# Patient Record
Sex: FEMALE | Race: WHITE | NOT HISPANIC OR LATINO | Employment: OTHER | URBAN - METROPOLITAN AREA
[De-identification: names, ages, dates, MRNs, and addresses within clinical notes are randomized per-mention and may not be internally consistent; named-entity substitution may affect disease eponyms.]

---

## 2019-02-08 ENCOUNTER — HOSPITAL ENCOUNTER (EMERGENCY)
Facility: HOSPITAL | Age: 59
Discharge: HOME | End: 2019-02-08
Attending: EMERGENCY MEDICINE
Payer: COMMERCIAL

## 2019-02-08 VITALS
WEIGHT: 170 LBS | SYSTOLIC BLOOD PRESSURE: 154 MMHG | TEMPERATURE: 98 F | BODY MASS INDEX: 34.27 KG/M2 | DIASTOLIC BLOOD PRESSURE: 73 MMHG | HEIGHT: 59 IN | OXYGEN SATURATION: 92 % | RESPIRATION RATE: 20 BRPM | HEART RATE: 101 BPM

## 2019-02-08 DIAGNOSIS — R06.02 SHORTNESS OF BREATH: ICD-10-CM

## 2019-02-08 DIAGNOSIS — J40 BRONCHITIS: Primary | ICD-10-CM

## 2019-02-08 LAB
ALBUMIN SERPL BCP-MCNC: 3.3 G/DL
ALP SERPL-CCNC: 120 U/L
ALT SERPL W/O P-5'-P-CCNC: 16 U/L
ANION GAP SERPL CALC-SCNC: 12 MMOL/L
AST SERPL-CCNC: 17 U/L
BASOPHILS # BLD AUTO: 0.01 K/UL
BASOPHILS NFR BLD: 0.1 %
BILIRUB SERPL-MCNC: 0.3 MG/DL
BNP SERPL-MCNC: 174 PG/ML
BUN SERPL-MCNC: 10 MG/DL
CALCIUM SERPL-MCNC: 9.4 MG/DL
CHLORIDE SERPL-SCNC: 106 MMOL/L
CO2 SERPL-SCNC: 20 MMOL/L
CREAT SERPL-MCNC: 0.8 MG/DL
DIFFERENTIAL METHOD: ABNORMAL
EOSINOPHIL # BLD AUTO: 0.6 K/UL
EOSINOPHIL NFR BLD: 5.1 %
ERYTHROCYTE [DISTWIDTH] IN BLOOD BY AUTOMATED COUNT: 12.5 %
EST. GFR  (AFRICAN AMERICAN): >60 ML/MIN/1.73 M^2
EST. GFR  (NON AFRICAN AMERICAN): >60 ML/MIN/1.73 M^2
GLUCOSE SERPL-MCNC: 115 MG/DL
HCT VFR BLD AUTO: 36.6 %
HGB BLD-MCNC: 12.2 G/DL
INR PPP: 1
LYMPHOCYTES # BLD AUTO: 0.8 K/UL
LYMPHOCYTES NFR BLD: 6.8 %
MCH RBC QN AUTO: 32 PG
MCHC RBC AUTO-ENTMCNC: 33.3 G/DL
MCV RBC AUTO: 96 FL
MONOCYTES # BLD AUTO: 1.4 K/UL
MONOCYTES NFR BLD: 11.6 %
NEUTROPHILS # BLD AUTO: 9.5 K/UL
NEUTROPHILS NFR BLD: 76 %
PLATELET # BLD AUTO: 366 K/UL
PMV BLD AUTO: 8.9 FL
POTASSIUM SERPL-SCNC: 4 MMOL/L
PROT SERPL-MCNC: 6.6 G/DL
PROTHROMBIN TIME: 10.2 SEC
RBC # BLD AUTO: 3.81 M/UL
SODIUM SERPL-SCNC: 138 MMOL/L
TROPONIN I SERPL DL<=0.01 NG/ML-MCNC: <0.006 NG/ML
WBC # BLD AUTO: 12.44 K/UL

## 2019-02-08 PROCEDURE — 63600175 PHARM REV CODE 636 W HCPCS: Performed by: EMERGENCY MEDICINE

## 2019-02-08 PROCEDURE — 99285 EMERGENCY DEPT VISIT HI MDM: CPT | Mod: 25

## 2019-02-08 PROCEDURE — 83880 ASSAY OF NATRIURETIC PEPTIDE: CPT

## 2019-02-08 PROCEDURE — 84484 ASSAY OF TROPONIN QUANT: CPT

## 2019-02-08 PROCEDURE — 80053 COMPREHEN METABOLIC PANEL: CPT

## 2019-02-08 PROCEDURE — 85610 PROTHROMBIN TIME: CPT

## 2019-02-08 PROCEDURE — 85025 COMPLETE CBC W/AUTO DIFF WBC: CPT

## 2019-02-08 PROCEDURE — 25000242 PHARM REV CODE 250 ALT 637 W/ HCPCS: Performed by: EMERGENCY MEDICINE

## 2019-02-08 PROCEDURE — 96374 THER/PROPH/DIAG INJ IV PUSH: CPT

## 2019-02-08 PROCEDURE — 94640 AIRWAY INHALATION TREATMENT: CPT

## 2019-02-08 PROCEDURE — 93005 ELECTROCARDIOGRAM TRACING: CPT

## 2019-02-08 RX ORDER — ISOSORBIDE DINITRATE 30 MG/1
20 TABLET ORAL 3 TIMES DAILY
COMMUNITY

## 2019-02-08 RX ORDER — METOPROLOL TARTRATE 50 MG/1
50 TABLET ORAL 2 TIMES DAILY
COMMUNITY
End: 2020-02-28 | Stop reason: SDUPTHER

## 2019-02-08 RX ORDER — IPRATROPIUM BROMIDE AND ALBUTEROL SULFATE 2.5; .5 MG/3ML; MG/3ML
3 SOLUTION RESPIRATORY (INHALATION)
Status: COMPLETED | OUTPATIENT
Start: 2019-02-08 | End: 2019-02-08

## 2019-02-08 RX ORDER — BENZONATATE 100 MG/1
100 CAPSULE ORAL 3 TIMES DAILY PRN
Qty: 20 CAPSULE | Refills: 0 | Status: SHIPPED | OUTPATIENT
Start: 2019-02-08 | End: 2019-02-18

## 2019-02-08 RX ORDER — METHYLPREDNISOLONE SOD SUCC 125 MG
125 VIAL (EA) INJECTION
Status: COMPLETED | OUTPATIENT
Start: 2019-02-08 | End: 2019-02-08

## 2019-02-08 RX ORDER — RANOLAZINE 500 MG/1
1000 TABLET, EXTENDED RELEASE ORAL 2 TIMES DAILY
COMMUNITY

## 2019-02-08 RX ORDER — ALBUTEROL SULFATE 90 UG/1
1-2 AEROSOL, METERED RESPIRATORY (INHALATION) EVERY 6 HOURS PRN
Qty: 1 INHALER | Refills: 0 | Status: SHIPPED | OUTPATIENT
Start: 2019-02-08 | End: 2020-03-09 | Stop reason: SDUPTHER

## 2019-02-08 RX ORDER — ONDANSETRON 4 MG/1
8 TABLET, FILM COATED ORAL 2 TIMES DAILY
COMMUNITY

## 2019-02-08 RX ORDER — ALPRAZOLAM 0.25 MG/1
0.25 TABLET ORAL 2 TIMES DAILY
COMMUNITY

## 2019-02-08 RX ORDER — LOVASTATIN 20 MG/1
20 TABLET ORAL NIGHTLY
COMMUNITY

## 2019-02-08 RX ORDER — CLOPIDOGREL BISULFATE 75 MG/1
75 TABLET ORAL DAILY
COMMUNITY

## 2019-02-08 RX ADMIN — IPRATROPIUM BROMIDE AND ALBUTEROL SULFATE 3 ML: .5; 3 SOLUTION RESPIRATORY (INHALATION) at 11:02

## 2019-02-08 RX ADMIN — METHYLPREDNISOLONE SODIUM SUCCINATE 125 MG: 125 INJECTION, POWDER, FOR SOLUTION INTRAMUSCULAR; INTRAVENOUS at 11:02

## 2019-02-08 NOTE — ED PROVIDER NOTES
Encounter Date: 2/8/2019    SCRIBE #1 NOTE: I, Renetta Jefferson, am scribing for, and in the presence of,  Dr. Platt. I have scribed the entire note.       History     Chief Complaint   Patient presents with    Shortness of Breath     hx of bronchitis, increase SOB began this am, denies asthma, EMS reports 93% on room air, after resp treatment 100% sats      Tia Cisneros is a 58 y.o. female who  has a past medical history of CHF (congestive heart failure), Coronary artery disease, Hypertension, and Pacemaker.    The patient presents to the ED due to shortness of breath. She reports onset of symptoms was last night. The patient does note she began with cough and congestion yesterday. She has yellow mucus production with cough. The patient has associated wheezing and chest heaviness but denies any  fever or chills. She notes some improvement of symptoms after breathing treatment provided by EMS. As per EMS prior to treatment the patient had an oxygen saturation of 93% which improved to 98%. She states she had similar symptoms a week ago and was treated with medications by an urgent care. The patient was prescribed Doxycycline. She admits to smoking cigarettes.       The history is provided by the patient.     Review of patient's allergies indicates:   Allergen Reactions    Iodine and iodide containing products Anaphylaxis    Codeine Nausea And Vomiting     Past Medical History:   Diagnosis Date    CHF (congestive heart failure)     Coronary artery disease     Hypertension     Pacemaker      Past Surgical History:   Procedure Laterality Date    CORONARY ANGIOPLASTY WITH STENT PLACEMENT      CORONARY ARTERY BYPASS GRAFT       History reviewed. No pertinent family history.  Social History     Tobacco Use    Smoking status: Current Every Day Smoker    Smokeless tobacco: Former User   Substance Use Topics    Alcohol use: No     Frequency: Never    Drug use: Not on file     Review of Systems    Constitutional: Negative for chills and fever.   HENT: Positive for congestion. Negative for rhinorrhea and sore throat.    Eyes: Negative for redness and visual disturbance.   Respiratory: Positive for cough, shortness of breath and wheezing.    Cardiovascular: Negative for chest pain and palpitations.   Gastrointestinal: Negative for abdominal pain, diarrhea, nausea and vomiting.   Genitourinary: Negative for dysuria and hematuria.   Musculoskeletal: Negative for back pain, myalgias and neck pain.   Skin: Negative for rash.   Neurological: Negative for dizziness, weakness and light-headedness.   Psychiatric/Behavioral: Negative for confusion.   All other systems reviewed and are negative.      Physical Exam     Initial Vitals [02/08/19 1037]   BP Pulse Resp Temp SpO2   (!) 165/96 103 (!) 21 99.6 °F (37.6 °C) 100 %      MAP       --         Physical Exam    Nursing note and vitals reviewed.  Constitutional: She appears well-developed and well-nourished. She is not diaphoretic. She appears distressed.   Arrived on supplemental oxygen nasal cannula   HENT:   Head: Normocephalic and atraumatic.   Eyes: Conjunctivae and EOM are normal.   Neck: Normal range of motion. Neck supple.   Cardiovascular: Regular rhythm and normal heart sounds. Tachycardia present.  Exam reveals no gallop and no friction rub.    No murmur heard.  Pulmonary/Chest: She has wheezes (bilateral expiratory). She has no rhonchi. She has no rales.   Musculoskeletal: Normal range of motion. She exhibits no edema or tenderness.   Lymphadenopathy:     She has no cervical adenopathy.   Neurological: She is alert and oriented to person, place, and time. She has normal strength.   Skin: Skin is warm and dry. No rash noted.         ED Course   Procedures  Labs Reviewed   CBC W/ AUTO DIFFERENTIAL - Abnormal; Notable for the following components:       Result Value    RBC 3.81 (*)     Hematocrit 36.6 (*)     MCH 32.0 (*)     Platelets 366 (*)     MPV 8.9 (*)      Gran # (ANC) 9.5 (*)     Lymph # 0.8 (*)     Mono # 1.4 (*)     Eos # 0.6 (*)     Gran% 76.0 (*)     Lymph% 6.8 (*)     All other components within normal limits   COMPREHENSIVE METABOLIC PANEL - Abnormal; Notable for the following components:    CO2 20 (*)     Glucose 115 (*)     Albumin 3.3 (*)     All other components within normal limits   B-TYPE NATRIURETIC PEPTIDE - Abnormal; Notable for the following components:     (*)     All other components within normal limits   TROPONIN I   PROTIME-INR     EKG Readings: (Independently Interpreted)   Sinus tachycardia with a rate of 102. Non-specific ST and T wave changes in inferolateral leads. WY interval of 202 ms. No STEMI       Imaging Results          X-Ray Chest AP Portable (Final result)  Result time 02/08/19 12:18:09    Final result by Gera Vila Jr., MD (02/08/19 12:18:09)                 Impression:      Mild increase in interstitial markings nonspecific.      Electronically signed by: Gera Vila MD  Date:    02/08/2019  Time:    12:18             Narrative:    EXAMINATION:  XR CHEST AP PORTABLE    CLINICAL HISTORY:  CHF;    TECHNIQUE:  Single frontal view of the chest was performed.    COMPARISON:  None    FINDINGS:  AICD and monitoring leads noted.  Necklace overlies the upper chest.  Heart size is normal.  Mild increase in interstitial markings.  No confluent consolidation.  No pneumothorax.                                 Medical Decision Making:   Initial Assessment:   The patient presents to the ED due to shortness of breath.  Differential Diagnosis:   Pneumonia, ACS, bronchitis, COPD, CHF  Independently Interpreted Test(s):   I have ordered and independently interpreted EKG Reading(s) - see prior notes  Clinical Tests:   Lab Tests: Ordered and Reviewed  Radiological Study: Ordered and Reviewed  Medical Tests: Ordered and Reviewed  ED Management:  1:10 PM Patient is 94% on room air, patient walking to and from the restroom without  difficulty. Patient non-toxic in appearance. No further wheezing on exam.                       Clinical Impression:     1. Bronchitis    2. Shortness of breath            I, Dr. Markos Platt, personally performed the services described in this documentation. All medical record entries made by the scribe were at my direction and in my presence.  I have reviewed the chart and agree that the record reflects my personal performance and is accurate and complete                       Markos Platt MD  02/08/19 3453

## 2019-02-08 NOTE — DISCHARGE INSTRUCTIONS
Return promptly if concerning symptoms arise, quit smoking, finish your recently prescribed antibiotics

## 2019-02-08 NOTE — ED NOTES
Pt presented to er with c/o sob x 2 days. No c/o chest pain or pressure. Pt stated she has a history of bronchitis. O2 93% on RA. Pt placed on o2 at 2L nc. No aute distress noted.

## 2020-02-28 ENCOUNTER — HOSPITAL ENCOUNTER (EMERGENCY)
Facility: HOSPITAL | Age: 60
Discharge: HOME | End: 2020-02-28
Attending: EMERGENCY MEDICINE
Payer: COMMERCIAL

## 2020-02-28 VITALS
SYSTOLIC BLOOD PRESSURE: 126 MMHG | DIASTOLIC BLOOD PRESSURE: 69 MMHG | BODY MASS INDEX: 34.27 KG/M2 | HEART RATE: 86 BPM | WEIGHT: 170 LBS | TEMPERATURE: 98 F | RESPIRATION RATE: 16 BRPM | HEIGHT: 59 IN | OXYGEN SATURATION: 94 %

## 2020-02-28 DIAGNOSIS — R07.9 CHEST PAIN: ICD-10-CM

## 2020-02-28 LAB
ALBUMIN SERPL BCP-MCNC: 3.4 G/DL (ref 3.5–5.2)
ALP SERPL-CCNC: 112 U/L (ref 55–135)
ALT SERPL W/O P-5'-P-CCNC: 17 U/L (ref 10–44)
ANION GAP SERPL CALC-SCNC: 13 MMOL/L (ref 8–16)
AST SERPL-CCNC: 12 U/L (ref 10–40)
BASOPHILS # BLD AUTO: 0.02 K/UL (ref 0–0.2)
BASOPHILS NFR BLD: 0.1 % (ref 0–1.9)
BILIRUB SERPL-MCNC: 0.3 MG/DL (ref 0.1–1)
BNP SERPL-MCNC: 67 PG/ML (ref 0–99)
BUN SERPL-MCNC: 16 MG/DL (ref 6–20)
CALCIUM SERPL-MCNC: 9.8 MG/DL (ref 8.7–10.5)
CHLORIDE SERPL-SCNC: 107 MMOL/L (ref 95–110)
CO2 SERPL-SCNC: 23 MMOL/L (ref 23–29)
CREAT SERPL-MCNC: 0.9 MG/DL (ref 0.5–1.4)
DIFFERENTIAL METHOD: ABNORMAL
EOSINOPHIL # BLD AUTO: 0.4 K/UL (ref 0–0.5)
EOSINOPHIL NFR BLD: 2.5 % (ref 0–8)
ERYTHROCYTE [DISTWIDTH] IN BLOOD BY AUTOMATED COUNT: 12.7 % (ref 11.5–14.5)
EST. GFR  (AFRICAN AMERICAN): >60 ML/MIN/1.73 M^2
EST. GFR  (NON AFRICAN AMERICAN): >60 ML/MIN/1.73 M^2
GLUCOSE SERPL-MCNC: 100 MG/DL (ref 70–110)
HCT VFR BLD AUTO: 39.4 % (ref 37–48.5)
HGB BLD-MCNC: 12.9 G/DL (ref 12–16)
IMM GRANULOCYTES # BLD AUTO: 0.03 K/UL (ref 0–0.04)
IMM GRANULOCYTES NFR BLD AUTO: 0.2 % (ref 0–0.5)
LYMPHOCYTES # BLD AUTO: 2 K/UL (ref 1–4.8)
LYMPHOCYTES NFR BLD: 13.3 % (ref 18–48)
MCH RBC QN AUTO: 32.4 PG (ref 27–31)
MCHC RBC AUTO-ENTMCNC: 32.7 G/DL (ref 32–36)
MCV RBC AUTO: 99 FL (ref 82–98)
MONOCYTES # BLD AUTO: 1.1 K/UL (ref 0.3–1)
MONOCYTES NFR BLD: 7.4 % (ref 4–15)
NEUTROPHILS # BLD AUTO: 11.5 K/UL (ref 1.8–7.7)
NEUTROPHILS NFR BLD: 76.5 % (ref 38–73)
NRBC BLD-RTO: 0 /100 WBC
PLATELET # BLD AUTO: 498 K/UL (ref 150–350)
PMV BLD AUTO: 8.8 FL (ref 9.2–12.9)
POTASSIUM SERPL-SCNC: 3.7 MMOL/L (ref 3.5–5.1)
PROT SERPL-MCNC: 7.2 G/DL (ref 6–8.4)
RBC # BLD AUTO: 3.98 M/UL (ref 4–5.4)
SODIUM SERPL-SCNC: 143 MMOL/L (ref 136–145)
TROPONIN I SERPL DL<=0.01 NG/ML-MCNC: 0.01 NG/ML (ref 0–0.03)
WBC # BLD AUTO: 15.08 K/UL (ref 3.9–12.7)

## 2020-02-28 PROCEDURE — 85025 COMPLETE CBC W/AUTO DIFF WBC: CPT

## 2020-02-28 PROCEDURE — 93010 ELECTROCARDIOGRAM REPORT: CPT | Mod: ,,, | Performed by: INTERNAL MEDICINE

## 2020-02-28 PROCEDURE — 84484 ASSAY OF TROPONIN QUANT: CPT

## 2020-02-28 PROCEDURE — 93010 EKG 12-LEAD: ICD-10-PCS | Mod: ,,, | Performed by: INTERNAL MEDICINE

## 2020-02-28 PROCEDURE — 99285 EMERGENCY DEPT VISIT HI MDM: CPT | Mod: 25

## 2020-02-28 PROCEDURE — 83880 ASSAY OF NATRIURETIC PEPTIDE: CPT

## 2020-02-28 PROCEDURE — 93005 ELECTROCARDIOGRAM TRACING: CPT

## 2020-02-28 PROCEDURE — 80053 COMPREHEN METABOLIC PANEL: CPT

## 2020-02-28 RX ORDER — METOPROLOL SUCCINATE 50 MG/1
TABLET, EXTENDED RELEASE ORAL
COMMUNITY

## 2020-02-28 RX ORDER — LISINOPRIL 10 MG/1
TABLET ORAL
COMMUNITY

## 2020-02-28 RX ORDER — LOSARTAN POTASSIUM 50 MG/1
50 TABLET ORAL
COMMUNITY

## 2020-02-28 RX ORDER — FUROSEMIDE 40 MG/1
TABLET ORAL
COMMUNITY

## 2020-02-28 RX ORDER — IBUPROFEN 800 MG/1
TABLET ORAL
COMMUNITY

## 2020-02-28 RX ORDER — CIMETIDINE 300 MG/1
TABLET, FILM COATED ORAL
COMMUNITY

## 2020-02-28 RX ORDER — ASPIRIN 325 MG
325 TABLET ORAL
Status: DISCONTINUED | OUTPATIENT
Start: 2020-02-28 | End: 2020-02-28 | Stop reason: HOSPADM

## 2020-02-28 NOTE — ED TRIAGE NOTES
"Reports to ED with c/o "hot flashes and nausea" states that she got on her knees to vomit in toilet and "woke up on the floor." Patient states that she did not hit her head but event was unwitnessed. Patient also reports cough x4 weeks with associated SOB. Reports chills. Denies abdominal pain, changes in urinary habits. \\    Two patient identifiers have been checked and are correct.      Appearance: Pt awake, alert & oriented to person, place & time. Pt in no acute distress at present time. Pt is clean and well groomed with clothes appropriately fastened.   Skin: Skin warm, dry & intact. Color consistent with ethnicity. Mucous membranes moist. No breakdown or brusing noted.   Musculoskeletal: Patient moving all extremities well, no obvious swelling or deformities noted.   Respiratory: Respirations spontaneous, even, and non-labored. Visible chest rise noted. Airway is open and patent. No accessory muscle use noted.   Neurologic: Sensation is intact. Speech is clear and appropriate. Eyes open spontaneously, behavior appropriate to situation, follows commands, facial expression symmetrical, bilateral hand grasp equal and even, purposeful motor response noted.  Cardiac: All peripheral pulses present. No Bilateral lower extremity edema. Cap refill is <3 seconds.  Abdomen: Abdomen soft, non-tender to palpation.   : Pt reports no dysuria or hematuria.             "

## 2020-02-29 NOTE — ED PROVIDER NOTES
Encounter Date: 2/28/2020    SCRIBE #1 NOTE: I, Marleny Hawkins, am scribing for, and in the presence of,  Edagr Inman MD. I have scribed the following portions of the note - Other sections scribed: HPI, ROS, PE.       History     Chief Complaint   Patient presents with    Shortness of Breath     sob.  [facility] states wheezing and tachypnic.     Loss of Consciousness     woke up on the floor earlier today.     CC: SOB; Nausea    Patient is a 60 y.o female with a PMHx of CHF, CAD, HTN, and MI who presents to the ED complaining of SOB over the past 4 week. She reports of cough and URI symptoms for 4 weeks with associated wheezing. She uses an inhaler at home. Patient has a SHx of cigarette smoking. Patient also complains episodes of nausea and hot flashes. She reports that symptoms lasted over a 1 hr this morning and returned this afternoon. These symptoms have since resolved. Patient has had these symptoms for the past year since her Pacemaker implantation. She states that episodes have been lasting longer than normal. There are no exacerbating or alleviating factors. Patient denies CP. Hx of MI, CABG. and cardiac stent placement. Her MI symptoms varied between CP and jaw pain. Her current symptoms do not feel similar to those associated with previous MI.     The history is provided by the patient.     Review of patient's allergies indicates:   Allergen Reactions    Iodine and iodide containing products Anaphylaxis    Codeine Nausea And Vomiting     Past Medical History:   Diagnosis Date    CHF (congestive heart failure)     Coronary artery disease     Hypertension     Pacemaker      Past Surgical History:   Procedure Laterality Date    CORONARY ANGIOPLASTY WITH STENT PLACEMENT      CORONARY ARTERY BYPASS GRAFT       History reviewed. No pertinent family history.  Social History     Tobacco Use    Smoking status: Current Every Day Smoker    Smokeless tobacco: Former User   Substance Use Topics     Alcohol use: No     Frequency: Never    Drug use: Not on file     Review of Systems   Constitutional: Positive for diaphoresis (Resolved). Negative for fever.   HENT: Negative for congestion, postnasal drip, rhinorrhea, sinus pressure, sneezing and sore throat.    Eyes: Negative for discharge and redness.   Respiratory: Positive for cough, shortness of breath and wheezing.    Cardiovascular: Negative for chest pain.   Gastrointestinal: Positive for nausea (Resolved). Negative for abdominal pain, blood in stool, constipation, diarrhea and vomiting.   Genitourinary: Negative for dysuria, hematuria, pelvic pain, vaginal bleeding, vaginal discharge and vaginal pain.   Musculoskeletal: Negative for arthralgias and myalgias.   Skin: Negative for rash and wound.   Neurological: Negative for weakness, light-headedness and headaches.   Psychiatric/Behavioral: Negative for agitation and confusion. The patient is not nervous/anxious.        Physical Exam     Initial Vitals [02/28/20 1754]   BP Pulse Resp Temp SpO2   128/87 88 18 98.5 °F (36.9 °C) 96 %      MAP       --         Physical Exam    Nursing note and vitals reviewed.  Constitutional: She appears well-developed and well-nourished. She is not diaphoretic. No distress.   HENT:   Head: Normocephalic and atraumatic.   Eyes: Conjunctivae are normal. Right eye exhibits no discharge. Left eye exhibits no discharge. No scleral icterus.   Neck: No tracheal deviation present. No JVD present.   Cardiovascular: Normal rate, regular rhythm, normal heart sounds and intact distal pulses. Exam reveals no gallop and no friction rub.    No murmur heard.  Pulmonary/Chest: No stridor. No respiratory distress. She has no wheezes. She has no rhonchi. She has no rales.   Abdominal: Soft. She exhibits no distension. There is no tenderness. There is no rebound and no guarding.   Musculoskeletal: Normal range of motion. She exhibits no edema or tenderness.   ALONZO with MELISSA's   Neurological:  She is alert and oriented to person, place, and time. She has normal strength.   ALONZO with NGND's   Skin: Skin is warm and dry. Capillary refill takes less than 2 seconds. No rash and no abscess noted. No erythema. No pallor.   Psychiatric: She has a normal mood and affect. Her behavior is normal. Judgment and thought content normal.         ED Course   Procedures  Labs Reviewed   CBC W/ AUTO DIFFERENTIAL - Abnormal; Notable for the following components:       Result Value    WBC 15.08 (*)     RBC 3.98 (*)     Mean Corpuscular Volume 99 (*)     Mean Corpuscular Hemoglobin 32.4 (*)     Platelets 498 (*)     MPV 8.8 (*)     Gran # (ANC) 11.5 (*)     Mono # 1.1 (*)     Gran% 76.5 (*)     Lymph% 13.3 (*)     All other components within normal limits   COMPREHENSIVE METABOLIC PANEL - Abnormal; Notable for the following components:    Albumin 3.4 (*)     All other components within normal limits   TROPONIN I   B-TYPE NATRIURETIC PEPTIDE     EKG Readings: (Independently Interpreted)   Initial Reading: No STEMI. Rhythm: Normal Sinus Rhythm. Heart Rate: 81. Ectopy: No Ectopy. Conduction: Normal. ST Segments: Normal ST Segments. T Waves: Normal. Axis: Normal. Clinical Impression: Normal Sinus Rhythm     ECG Results          EKG 12-lead (Final result)  Result time 02/29/20 14:41:52    Final result by Interface, Lab In [facility] (02/29/20 14:41:52)                 Narrative:    Test Reason : R07.9,    Vent. Rate : 081 BPM     Atrial Rate : 081 BPM     P-R Int : 168 ms          QRS Dur : 106 ms      QT Int : 386 ms       P-R-T Axes : 082 069 245 degrees     QTc Int : 448 ms    Normal sinus rhythm  Possible Left atrial enlargement  ST and T wave abnormality, consider inferolateral ischemia  Abnormal ECG  When compared with ECG of 08-FEB-2019 11:29,  No significant change was found  Confirmed by Rigo Jones MD ([id number]) on 2/29/2020 2:41:43 PM    Referred By: GINGER   SELF           Confirmed By:Rigo Jones MD                             Imaging Results          X-Ray Chest AP Portable (Final result)  Result time 02/28/20 18:40:21    Final result by Leo Patterson MD (02/28/20 18:40:21)                 Impression:      No acute abnormality.      Electronically signed by: Leo Patterson  Date:    02/28/2020  Time:    18:40             Narrative:    EXAMINATION:  XR CHEST AP PORTABLE    CLINICAL HISTORY:  Chest Pain;    TECHNIQUE:  Single frontal view of the chest was performed.    COMPARISON:  02/08/2019    FINDINGS:  Sternotomy wires are present.    Cardiac assist device on the left.    The lungs are clear, with normal appearance of pulmonary vasculature and no pleural effusion or pneumothorax.    The cardiac silhouette is normal in size. The hilar and mediastinal contours are unremarkable.    Bones are intact.    No significant change.                                 Medical Decision Making:   History:   Old Medical Records: I decided to obtain old medical records.  Independently Interpreted Test(s):   I have ordered and independently interpreted EKG Reading(s) - see prior notes  Clinical Tests:   Lab Tests: Ordered and Reviewed  Radiological Study: Ordered and Reviewed  Medical Tests: Ordered and Reviewed            Scribe Attestation:   Scribe #1: I performed the above scribed service and the documentation accurately describes the services I performed. I attest to the accuracy of the note.      Pt arrived alert, afebrile, non-toxic in appearance, in no acute respiratory distress with VSS.  EKG revealed no acute findings concerning for ischemia, arrhythmia, heart block or any pathology to warrant cardiology consultation.  CXR revealed no acute pathology.  Labs showed no acute findings.  Pt was asymptomatic throughout ED observation with no clinical findings to warrant further evaluation at this time. Pt discharged and counseled on the need to return to the nearest emergency room if they experience any other concerning symptoms.  Pt  counseled to F/U outpatient with a PCP over the next two to three days.    Edgar Inman MD                              Clinical Impression:       ICD-10-CM ICD-9-CM   1. Chest pain R07.9 786.50         Disposition:   Disposition: Discharged  Condition: Stable     ED Disposition Condition    Discharge Stable        ED Prescriptions     None        Follow-up Information     Follow up With Specialties Details Why Contact Info    [facility]  Schedule an appointment as soon as possible for a visit in 3 days or with your primary care physician to follow-up on today's visit [address] [place] [address]  [phone]      [facility] Emergency Medicine Go to  As needed, If symptoms worsen [address]  [facility] [id number] [phone]                         I, Edgar Inman, personally performed the services described in this documentation. All medical record entries made by the scribe were at my direction and in my presence.  I have reviewed the chart and agree that the record reflects my personal performance and is accurate and complete.               Edgar Inman MD  02/29/20 3340

## 2020-02-29 NOTE — ED NOTES
PT RESTING IN BED, DENIES ANY COMPLAINTS, RESP EVEN AND UNLABORED, SKIN IS WARM AND DRY, IV INTACT- NO REDNESS OR EDEMA AT THE SITE, SIDERAILS X 2

## 2020-03-09 ENCOUNTER — HOSPITAL ENCOUNTER (EMERGENCY)
Facility: HOSPITAL | Age: 60
Discharge: HOME | End: 2020-03-09
Attending: EMERGENCY MEDICINE
Payer: COMMERCIAL

## 2020-03-09 VITALS
HEIGHT: 58 IN | RESPIRATION RATE: 20 BRPM | TEMPERATURE: 98 F | SYSTOLIC BLOOD PRESSURE: 131 MMHG | DIASTOLIC BLOOD PRESSURE: 66 MMHG | BODY MASS INDEX: 35.68 KG/M2 | HEART RATE: 82 BPM | OXYGEN SATURATION: 95 % | WEIGHT: 170 LBS

## 2020-03-09 DIAGNOSIS — R06.02 SHORTNESS OF BREATH: ICD-10-CM

## 2020-03-09 DIAGNOSIS — R60.0 LEG EDEMA, LEFT: ICD-10-CM

## 2020-03-09 DIAGNOSIS — R06.02 SOB (SHORTNESS OF BREATH): ICD-10-CM

## 2020-03-09 DIAGNOSIS — J20.9 BRONCHITIS WITH BRONCHOSPASM: Primary | ICD-10-CM

## 2020-03-09 LAB
ALBUMIN SERPL BCP-MCNC: 3.3 G/DL (ref 3.5–5.2)
ALP SERPL-CCNC: 103 U/L (ref 55–135)
ALT SERPL W/O P-5'-P-CCNC: 18 U/L (ref 10–44)
ANION GAP SERPL CALC-SCNC: 12 MMOL/L (ref 8–16)
AST SERPL-CCNC: 23 U/L (ref 10–40)
BASOPHILS # BLD AUTO: 0.04 K/UL (ref 0–0.2)
BASOPHILS NFR BLD: 0.3 % (ref 0–1.9)
BILIRUB SERPL-MCNC: 0.1 MG/DL (ref 0.1–1)
BNP SERPL-MCNC: 127 PG/ML (ref 0–99)
BUN SERPL-MCNC: 13 MG/DL (ref 6–20)
CALCIUM SERPL-MCNC: 9.4 MG/DL (ref 8.7–10.5)
CHLORIDE SERPL-SCNC: 108 MMOL/L (ref 95–110)
CO2 SERPL-SCNC: 22 MMOL/L (ref 23–29)
CREAT SERPL-MCNC: 0.8 MG/DL (ref 0.5–1.4)
D DIMER PPP IA.FEU-MCNC: 0.47 MG/L FEU
DIFFERENTIAL METHOD: ABNORMAL
EOSINOPHIL # BLD AUTO: 1.5 K/UL (ref 0–0.5)
EOSINOPHIL NFR BLD: 9.4 % (ref 0–8)
ERYTHROCYTE [DISTWIDTH] IN BLOOD BY AUTOMATED COUNT: 12.7 % (ref 11.5–14.5)
EST. GFR  (AFRICAN AMERICAN): >60 ML/MIN/1.73 M^2
EST. GFR  (NON AFRICAN AMERICAN): >60 ML/MIN/1.73 M^2
GLUCOSE SERPL-MCNC: 108 MG/DL (ref 70–110)
HCT VFR BLD AUTO: 37.5 % (ref 37–48.5)
HGB BLD-MCNC: 12.6 G/DL (ref 12–16)
IMM GRANULOCYTES # BLD AUTO: 0.09 K/UL (ref 0–0.04)
IMM GRANULOCYTES NFR BLD AUTO: 0.6 % (ref 0–0.5)
LYMPHOCYTES # BLD AUTO: 2.8 K/UL (ref 1–4.8)
LYMPHOCYTES NFR BLD: 17.8 % (ref 18–48)
MCH RBC QN AUTO: 32.8 PG (ref 27–31)
MCHC RBC AUTO-ENTMCNC: 33.6 G/DL (ref 32–36)
MCV RBC AUTO: 98 FL (ref 82–98)
MONOCYTES # BLD AUTO: 1.2 K/UL (ref 0.3–1)
MONOCYTES NFR BLD: 7.6 % (ref 4–15)
NEUTROPHILS # BLD AUTO: 10.1 K/UL (ref 1.8–7.7)
NEUTROPHILS NFR BLD: 64.3 % (ref 38–73)
NRBC BLD-RTO: 0 /100 WBC
PLATELET # BLD AUTO: 560 K/UL (ref 150–350)
PLATELET BLD QL SMEAR: ABNORMAL
PMV BLD AUTO: 8.8 FL (ref 9.2–12.9)
POTASSIUM SERPL-SCNC: 5 MMOL/L (ref 3.5–5.1)
PROT SERPL-MCNC: 7.4 G/DL (ref 6–8.4)
RBC # BLD AUTO: 3.84 M/UL (ref 4–5.4)
SODIUM SERPL-SCNC: 142 MMOL/L (ref 136–145)
TROPONIN I SERPL DL<=0.01 NG/ML-MCNC: 0.01 NG/ML (ref 0–0.03)
WBC # BLD AUTO: 15.62 K/UL (ref 3.9–12.7)

## 2020-03-09 PROCEDURE — 25000003 PHARM REV CODE 250: Performed by: EMERGENCY MEDICINE

## 2020-03-09 PROCEDURE — 93010 EKG 12-LEAD: ICD-10-PCS | Mod: ,,, | Performed by: INTERNAL MEDICINE

## 2020-03-09 PROCEDURE — 96374 THER/PROPH/DIAG INJ IV PUSH: CPT

## 2020-03-09 PROCEDURE — 93005 ELECTROCARDIOGRAM TRACING: CPT

## 2020-03-09 PROCEDURE — 80053 COMPREHEN METABOLIC PANEL: CPT

## 2020-03-09 PROCEDURE — 94644 CONT INHLJ TX 1ST HOUR: CPT

## 2020-03-09 PROCEDURE — 85025 COMPLETE CBC W/AUTO DIFF WBC: CPT

## 2020-03-09 PROCEDURE — 63600175 PHARM REV CODE 636 W HCPCS: Performed by: EMERGENCY MEDICINE

## 2020-03-09 PROCEDURE — 85379 FIBRIN DEGRADATION QUANT: CPT

## 2020-03-09 PROCEDURE — 83880 ASSAY OF NATRIURETIC PEPTIDE: CPT

## 2020-03-09 PROCEDURE — 96361 HYDRATE IV INFUSION ADD-ON: CPT

## 2020-03-09 PROCEDURE — 93010 ELECTROCARDIOGRAM REPORT: CPT | Mod: ,,, | Performed by: INTERNAL MEDICINE

## 2020-03-09 PROCEDURE — 99285 EMERGENCY DEPT VISIT HI MDM: CPT | Mod: 25

## 2020-03-09 PROCEDURE — 84484 ASSAY OF TROPONIN QUANT: CPT

## 2020-03-09 PROCEDURE — 25000242 PHARM REV CODE 250 ALT 637 W/ HCPCS: Performed by: EMERGENCY MEDICINE

## 2020-03-09 RX ORDER — ONDANSETRON 4 MG/1
4 TABLET, ORALLY DISINTEGRATING ORAL
Status: COMPLETED | OUTPATIENT
Start: 2020-03-09 | End: 2020-03-09

## 2020-03-09 RX ORDER — ALBUTEROL SULFATE 90 UG/1
1-2 AEROSOL, METERED RESPIRATORY (INHALATION) EVERY 6 HOURS PRN
Qty: 18 G | Refills: 2 | Status: SHIPPED | OUTPATIENT
Start: 2020-03-09 | End: 2021-03-09

## 2020-03-09 RX ORDER — ASPIRIN 325 MG
325 TABLET ORAL
Status: COMPLETED | OUTPATIENT
Start: 2020-03-09 | End: 2020-03-09

## 2020-03-09 RX ORDER — PREDNISONE 20 MG/1
40 TABLET ORAL DAILY
Qty: 10 TABLET | Refills: 0 | Status: SHIPPED | OUTPATIENT
Start: 2020-03-09 | End: 2020-03-14

## 2020-03-09 RX ORDER — IPRATROPIUM BROMIDE AND ALBUTEROL SULFATE 2.5; .5 MG/3ML; MG/3ML
3 SOLUTION RESPIRATORY (INHALATION) EVERY 6 HOURS PRN
Qty: 1 BOX | Refills: 0 | Status: SHIPPED | OUTPATIENT
Start: 2020-03-09 | End: 2021-03-09

## 2020-03-09 RX ORDER — IPRATROPIUM BROMIDE AND ALBUTEROL SULFATE 2.5; .5 MG/3ML; MG/3ML
9 SOLUTION RESPIRATORY (INHALATION) CONTINUOUS
Status: DISPENSED | OUTPATIENT
Start: 2020-03-09 | End: 2020-03-09

## 2020-03-09 RX ORDER — METHYLPREDNISOLONE SOD SUCC 125 MG
125 VIAL (EA) INJECTION
Status: COMPLETED | OUTPATIENT
Start: 2020-03-09 | End: 2020-03-09

## 2020-03-09 RX ADMIN — IPRATROPIUM BROMIDE AND ALBUTEROL SULFATE 3 ML: .5; 3 SOLUTION RESPIRATORY (INHALATION) at 07:03

## 2020-03-09 RX ADMIN — ONDANSETRON 4 MG: 4 TABLET, ORALLY DISINTEGRATING ORAL at 06:03

## 2020-03-09 RX ADMIN — METHYLPREDNISOLONE SODIUM SUCCINATE 125 MG: 125 INJECTION, POWDER, FOR SOLUTION INTRAMUSCULAR; INTRAVENOUS at 07:03

## 2020-03-09 RX ADMIN — ASPIRIN 325 MG ORAL TABLET 325 MG: 325 PILL ORAL at 06:03

## 2020-03-09 RX ADMIN — SODIUM CHLORIDE 1000 ML: 0.9 INJECTION, SOLUTION INTRAVENOUS at 07:03

## 2020-03-09 NOTE — DISCHARGE INSTRUCTIONS
As we discussed, it is important that you return to the ER for any new concerns or symptoms, worsening of your existing symptoms, if you do not completely improve, or if you are unable to be seen by your primary care provider.    Some medical conditions are difficult to diagnose and may not be identified during an ER visit. Today, we did not find a medical condition that required inpatient admission, but please remember that medical conditions can change, and we cannot predict how you will be feeling tomorrow or the next day, so if you have any worsening or new symptoms, you should not hesitate to return to the emergency department for reevaluation.     Be sure to follow up with your primary care doctor for a recheck and to review any labs/imaging that were performed today.  If you do not have a primary care doctor, you may contact the one listed on your discharge paperwork or you may also call the [facility] Appointment Desk at [phone] to schedule an appointment with one.       All medications have side effects.  We have done our best to select a medication for you that will treat your health problem and have the least amount of side effects.  If at any time while or after you take this medication you develops new symptoms or have any concerns, it is important you stop taking the medication and call your primary care provider or return to the emergency department for evaluation.    Do not drive or operate heavy machinery for 24 hours if you have received any pain medications, sedatives or mood altering drugs during your ER visit.

## 2020-03-09 NOTE — ED NOTES
Pt resting comfortably and independently repositioned in stretcher with bed locked in lowest position for safety. NAD noted at this time. Respirations even and unlabored and visible chest rise noted. Patient offered bathroom assistance and denies need at this time. Pt instructed to call if assistance is needed. Pt on continuous cardiac, BP, and O2 monitoring. Call light within reach. No needs at this time. Will continue to monitor.

## 2020-03-09 NOTE — ED PROVIDER NOTES
Encounter Date: 3/9/2020    SCRIBE #1 NOTE: I, Jenn Stewart, am scribing for, and in the presence of,  Jayna Daniel MD. I have scribed the following portions of the note - Other sections scribed: HPI, ROS, PE.       History     Chief Complaint   Patient presents with    Shortness of Breath     pt reports SOB starting at 5am this morning while driving to work; pt reports hx of CHF & has pacemaker; pt denies any other symptoms     CC: Shortness of breath    HPI:  This is a 60 y.o. female smoker with a PMHx of coronary stents, defibrillator, and a pacemaker procedure. She presents to the Emergency Department with a cc of constant shortness of breath. Patient reports symptoms persisting throughout the month, and has developed a constant productive cough persisting for two days. Additionally, she reports associated symptoms of nausea, emesis, wheezing, and hot flashes. Patient reports the use of inhaler twice a day. No alleviating or worsening factors reported reported. No prior history of similar systems were reported.           Review of patient's allergies indicates:   Allergen Reactions    Iodine and iodide containing products Anaphylaxis    Codeine Nausea And Vomiting     Past Medical History:   Diagnosis Date    CHF (congestive heart failure)     Coronary artery disease     Hypertension     Pacemaker      Past Surgical History:   Procedure Laterality Date    CORONARY ANGIOPLASTY WITH STENT PLACEMENT      CORONARY ARTERY BYPASS GRAFT       History reviewed. No pertinent family history.  Social History     Tobacco Use    Smoking status: Current Every Day Smoker    Smokeless tobacco: Former User   Substance Use Topics    Alcohol use: No     Frequency: Never    Drug use: Not on file     Review of Systems   Constitutional: Negative for fever.   HENT: Negative for facial swelling and rhinorrhea.    Eyes: Negative for visual disturbance.   Respiratory: Positive for cough (Productive), shortness of breath  and wheezing.    Cardiovascular: Negative for chest pain.   Gastrointestinal: Positive for nausea and vomiting. Negative for abdominal pain.   Genitourinary: Negative for dysuria, frequency and hematuria.   Musculoskeletal: Positive for neck pain. Negative for arthralgias.   Skin: Negative for color change.   Neurological: Negative for facial asymmetry.       Physical Exam     Initial Vitals [03/09/20 0539]   BP Pulse Resp Temp SpO2   (!) 173/91 88 20 97.6 °F (36.4 °C) 96 %      MAP       --         Physical Exam    Nursing note and vitals reviewed.  Constitutional: She appears well-developed and well-nourished.   HENT:   Mouth/Throat: Mucous membranes are dry.   Eyes: EOM are normal. Pupils are equal, round, and reactive to light.   Neck: Normal range of motion. Neck supple.   Cardiovascular: Normal rate and regular rhythm.   Pulmonary/Chest: She has wheezes.   Abdominal: Soft. There is no tenderness.   Musculoskeletal: She exhibits edema (+) 2+ left leg..   (+) Toes slightly distended.   Neurological: She is alert and oriented to person, place, and time.   Skin: Skin is warm. Capillary refill takes less than 2 seconds.         ED Course   Procedures  Labs Reviewed   CBC W/ AUTO DIFFERENTIAL - Abnormal; Notable for the following components:       Result Value    WBC 15.62 (*)     RBC 3.84 (*)     Mean Corpuscular Hemoglobin 32.8 (*)     Platelets 560 (*)     MPV 8.8 (*)     Immature Granulocytes 0.6 (*)     Gran # (ANC) 10.1 (*)     Immature Grans (Abs) 0.09 (*)     Mono # 1.2 (*)     Eos # 1.5 (*)     Lymph% 17.8 (*)     Eosinophil% 9.4 (*)     Platelet Estimate Increased (*)     All other components within normal limits   COMPREHENSIVE METABOLIC PANEL - Abnormal; Notable for the following components:    CO2 22 (*)     Albumin 3.3 (*)     All other components within normal limits   B-TYPE NATRIURETIC PEPTIDE - Abnormal; Notable for the following components:     (*)     All other components within normal  limits   TROPONIN I   D DIMER, QUANTITATIVE          Imaging Results          US Lower Extremity Veins Left (Final result)  Result time 03/09/20 09:41:53    Final result by Preet Durham MD (03/09/20 09:41:53)                 Impression:      No evidence of deep venous thrombosis in the left lower extremity.      Electronically signed by: Preet Durham MD  Date:    03/09/2020  Time:    09:41             Narrative:    EXAMINATION:  US LOWER EXTREMITY VEINS LEFT    CLINICAL HISTORY:  Localized edema    TECHNIQUE:  Duplex and color flow Doppler evaluation and graded compression of the left lower extremity veins was performed.    COMPARISON:  None    FINDINGS:  Left thigh veins: The common femoral, femoral, popliteal, upper greater saphenous, and deep femoral veins are patent and free of thrombus. The veins are normally compressible and have normal phasic flow and augmentation response.    Left calf veins: The visualized calf veins are patent.    Contralateral CFV: The contralateral (right) common femoral vein is patent and free of thrombus.    Miscellaneous: None                               X-Ray Chest AP Portable (Final result)  Result time 03/09/20 07:04:57    Final result by Brennan Painter MD (03/09/20 07:04:57)                 Impression:      No radiographic evidence of acute intrathoracic process.      Electronically signed by: Brennan Painter MD  Date:    03/09/2020  Time:    07:04             Narrative:    EXAMINATION:  XR CHEST AP PORTABLE    CLINICAL HISTORY:  shortness of breath;    TECHNIQUE:  Single frontal view of the chest was performed.    COMPARISON:  02/28/2020    FINDINGS:  Cardiac monitoring leads overlie the chest.  There is a left chest wall cardiac pacing device present.  There is postoperative change of prior median sternotomy.  Cardiomediastinal silhouette is mildly enlarged, similar to prior examination.  Lungs are symmetrically expanded without evidence of confluent airspace  consolidation or pleural effusion.  There is no evidence of pneumothorax.  Osseous structures demonstrate stable degenerative changes.                                            Scribe Attestation:   Scribe #1: I performed the above scribed service and the documentation accurately describes the services I performed. I attest to the accuracy of the note.            ED Course as of Mar 09 0955   Mon Mar 09, 2020   0638 Leukocytosis noted.   WBC(!): 15.62 [MH]   0654 Platelets(!): 560 [MH]   0716 BNP is elevated at 127.   BNP(!): 127 [MH]   0717 Troponin is normal   Troponin I: 0.010 [MH]   0717 CMP is normal   Comprehensive metabolic panel(!) [MH]   0717 Chest x-ray is normal    [MH]   0718 My independent interpretation of the EKG is normal sinus rhythm with a nonspecific interventricular block and RSR prime in V5 and V6 which is unchanged from previous EKG.  The rate is 87.  There is no evidence of STEMI.  The EKG is similar to past EKG   EKG 12-lead [MH]   0821 Dimer wnl    [MH]   0852 Feeling better after nebulizer    [MH]   0952 No dvt.    [MH]   0952 Feeling better, ready to be discharged    [MH]      ED Course User Index  [MH] Jayna Daniel MD                Clinical Impression:       ICD-10-CM ICD-9-CM   1. Bronchitis with bronchospasm J20.9 490   2. Shortness of breath R06.02 786.05   3. SOB (shortness of breath) R06.02 786.05   4. Leg edema, left R60.0 782.3                I personally performed the services described in this documentation. All medical record entries made by the scribe were at my direction and in my presence. I have reviewed the chart and agree that the record reflects my personal performance and is accurate and complete. Jayna Daniel MD  03/09/20 0811       Jayna Daniel MD  03/09/20 0955

## 2022-04-09 ENCOUNTER — HISTORICAL (OUTPATIENT)
Dept: ADMINISTRATIVE | Facility: HOSPITAL | Age: 62
End: 2022-04-09
Payer: COMMERCIAL

## 2022-04-25 VITALS
WEIGHT: 159.81 LBS | OXYGEN SATURATION: 79 % | HEIGHT: 58 IN | BODY MASS INDEX: 33.55 KG/M2 | DIASTOLIC BLOOD PRESSURE: 79 MMHG | SYSTOLIC BLOOD PRESSURE: 131 MMHG

## 2022-08-13 ENCOUNTER — HOSPITAL ENCOUNTER (INPATIENT)
Facility: HOSPITAL | Age: 62
LOS: 2 days | Discharge: HOME HEALTH | DRG: 281 | End: 2022-08-15
Attending: EMERGENCY MEDICINE | Admitting: INTERNAL MEDICINE
Payer: MEDICAID

## 2022-08-13 DIAGNOSIS — I25.10 CAD (CORONARY ARTERY DISEASE): ICD-10-CM

## 2022-08-13 DIAGNOSIS — I21.4 NSTEMI (NON-ST ELEVATION MYOCARDIAL INFARCTION): ICD-10-CM

## 2022-08-13 DIAGNOSIS — R00.0 WIDE-COMPLEX TACHYCARDIA: ICD-10-CM

## 2022-08-13 DIAGNOSIS — I50.9 CHF WITH CARDIOMYOPATHY: ICD-10-CM

## 2022-08-13 DIAGNOSIS — I21.4 NSTEMI (NON-ST ELEVATED MYOCARDIAL INFARCTION): ICD-10-CM

## 2022-08-13 DIAGNOSIS — I42.9 CHF WITH CARDIOMYOPATHY: ICD-10-CM

## 2022-08-13 DIAGNOSIS — R00.0 TACHYCARDIA: ICD-10-CM

## 2022-08-13 DIAGNOSIS — I10 BENIGN ESSENTIAL HYPERTENSION: ICD-10-CM

## 2022-08-13 DIAGNOSIS — I47.10 PSVT (PAROXYSMAL SUPRAVENTRICULAR TACHYCARDIA): Primary | ICD-10-CM

## 2022-08-13 PROBLEM — Z95.810 IMPLANTABLE CARDIOVERTER-DEFIBRILLATOR (ICD) IN SITU: Status: ACTIVE | Noted: 2022-08-13

## 2022-08-13 PROBLEM — Z98.61 HISTORY OF PTCA: Chronic | Status: ACTIVE | Noted: 2022-08-13

## 2022-08-13 PROBLEM — I25.2 HISTORY OF MI (MYOCARDIAL INFARCTION): Chronic | Status: ACTIVE | Noted: 2022-08-13

## 2022-08-13 PROBLEM — Z95.1 HX OF CABG: Chronic | Status: ACTIVE | Noted: 2022-08-13

## 2022-08-13 PROBLEM — I20.89 ATYPICAL ANGINA: Chronic | Status: ACTIVE | Noted: 2022-08-13

## 2022-08-13 PROBLEM — I50.22 CHRONIC SYSTOLIC CONGESTIVE HEART FAILURE: Status: ACTIVE | Noted: 2022-08-10

## 2022-08-13 LAB
ALBUMIN SERPL-MCNC: 3.3 GM/DL (ref 3.4–4.8)
ALBUMIN/GLOB SERPL: 1.2 RATIO (ref 1.1–2)
ALP SERPL-CCNC: 93 UNIT/L (ref 40–150)
ALT SERPL-CCNC: 9 UNIT/L (ref 0–55)
AST SERPL-CCNC: 11 UNIT/L (ref 5–34)
BASOPHILS # BLD AUTO: 0.04 X10(3)/MCL (ref 0–0.2)
BASOPHILS NFR BLD AUTO: 0.3 %
BILIRUBIN DIRECT+TOT PNL SERPL-MCNC: 0.4 MG/DL
BNP BLD-MCNC: 2450.7 PG/ML
BUN SERPL-MCNC: 14.3 MG/DL (ref 9.8–20.1)
CALCIUM SERPL-MCNC: 9.2 MG/DL (ref 8.4–10.2)
CHLORIDE SERPL-SCNC: 112 MMOL/L (ref 98–107)
CK MB SERPL-MCNC: 2.1 NG/ML
CK MB SERPL-MCNC: 6.2 NG/ML
CO2 SERPL-SCNC: 18 MMOL/L (ref 23–31)
CREAT SERPL-MCNC: 0.76 MG/DL (ref 0.55–1.02)
EOSINOPHIL # BLD AUTO: 0.15 X10(3)/MCL (ref 0–0.9)
EOSINOPHIL NFR BLD AUTO: 1.2 %
ERYTHROCYTE [DISTWIDTH] IN BLOOD BY AUTOMATED COUNT: 14.8 % (ref 11.5–17)
GFR SERPLBLD CREATININE-BSD FMLA CKD-EPI: >60 MLS/MIN/1.73/M2
GLOBULIN SER-MCNC: 2.8 GM/DL (ref 2.4–3.5)
GLUCOSE SERPL-MCNC: 113 MG/DL (ref 82–115)
HCT VFR BLD AUTO: 35.4 % (ref 37–47)
HGB BLD-MCNC: 11.1 GM/DL (ref 12–16)
IMM GRANULOCYTES # BLD AUTO: 0.04 X10(3)/MCL (ref 0–0.04)
IMM GRANULOCYTES NFR BLD AUTO: 0.3 %
LYMPHOCYTES # BLD AUTO: 3.88 X10(3)/MCL (ref 0.6–4.6)
LYMPHOCYTES NFR BLD AUTO: 29.8 %
MAGNESIUM SERPL-MCNC: 1.5 MG/DL (ref 1.6–2.6)
MCH RBC QN AUTO: 32.2 PG (ref 27–31)
MCHC RBC AUTO-ENTMCNC: 31.4 MG/DL (ref 33–36)
MCV RBC AUTO: 102.6 FL (ref 80–94)
MONOCYTES # BLD AUTO: 1.44 X10(3)/MCL (ref 0.1–1.3)
MONOCYTES NFR BLD AUTO: 11.1 %
NEUTROPHILS # BLD AUTO: 7.5 X10(3)/MCL (ref 2.1–9.2)
NEUTROPHILS NFR BLD AUTO: 57.3 %
NRBC BLD AUTO-RTO: 0 %
PLATELET # BLD AUTO: 341 X10(3)/MCL (ref 130–400)
PMV BLD AUTO: 10.3 FL (ref 7.4–10.4)
POTASSIUM SERPL-SCNC: 4.3 MMOL/L (ref 3.5–5.1)
PROT SERPL-MCNC: 6.1 GM/DL (ref 5.8–7.6)
RBC # BLD AUTO: 3.45 X10(6)/MCL (ref 4.2–5.4)
SODIUM SERPL-SCNC: 140 MMOL/L (ref 136–145)
TROPONIN I SERPL-MCNC: 0.2 NG/ML (ref 0–0.04)
TROPONIN I SERPL-MCNC: 1.19 NG/ML (ref 0–0.04)
WBC # SPEC AUTO: 13 X10(3)/MCL (ref 4.5–11.5)

## 2022-08-13 PROCEDURE — 93010 ELECTROCARDIOGRAM REPORT: CPT | Mod: ,,, | Performed by: INTERNAL MEDICINE

## 2022-08-13 PROCEDURE — 93005 ELECTROCARDIOGRAM TRACING: CPT

## 2022-08-13 PROCEDURE — 63600175 PHARM REV CODE 636 W HCPCS: Performed by: EMERGENCY MEDICINE

## 2022-08-13 PROCEDURE — 11000001 HC ACUTE MED/SURG PRIVATE ROOM

## 2022-08-13 PROCEDURE — 96374 THER/PROPH/DIAG INJ IV PUSH: CPT

## 2022-08-13 PROCEDURE — 93010 EKG 12-LEAD: ICD-10-PCS | Mod: ,,, | Performed by: INTERNAL MEDICINE

## 2022-08-13 PROCEDURE — 63600175 PHARM REV CODE 636 W HCPCS

## 2022-08-13 PROCEDURE — 63600175 PHARM REV CODE 636 W HCPCS: Performed by: INTERNAL MEDICINE

## 2022-08-13 PROCEDURE — 36415 COLL VENOUS BLD VENIPUNCTURE: CPT | Performed by: INTERNAL MEDICINE

## 2022-08-13 PROCEDURE — 83735 ASSAY OF MAGNESIUM: CPT | Performed by: EMERGENCY MEDICINE

## 2022-08-13 PROCEDURE — 25000003 PHARM REV CODE 250: Performed by: EMERGENCY MEDICINE

## 2022-08-13 PROCEDURE — 36415 COLL VENOUS BLD VENIPUNCTURE: CPT | Performed by: EMERGENCY MEDICINE

## 2022-08-13 PROCEDURE — 84484 ASSAY OF TROPONIN QUANT: CPT | Performed by: EMERGENCY MEDICINE

## 2022-08-13 PROCEDURE — 25000003 PHARM REV CODE 250: Performed by: INTERNAL MEDICINE

## 2022-08-13 PROCEDURE — 93010 ELECTROCARDIOGRAM REPORT: CPT | Mod: ,,, | Performed by: STUDENT IN AN ORGANIZED HEALTH CARE EDUCATION/TRAINING PROGRAM

## 2022-08-13 PROCEDURE — 83880 ASSAY OF NATRIURETIC PEPTIDE: CPT | Performed by: EMERGENCY MEDICINE

## 2022-08-13 PROCEDURE — 85025 COMPLETE CBC W/AUTO DIFF WBC: CPT | Performed by: EMERGENCY MEDICINE

## 2022-08-13 PROCEDURE — 21400001 HC TELEMETRY ROOM

## 2022-08-13 PROCEDURE — 93010 EKG 12-LEAD: ICD-10-PCS | Mod: ,,, | Performed by: STUDENT IN AN ORGANIZED HEALTH CARE EDUCATION/TRAINING PROGRAM

## 2022-08-13 PROCEDURE — 82553 CREATINE MB FRACTION: CPT | Performed by: EMERGENCY MEDICINE

## 2022-08-13 PROCEDURE — 99285 EMERGENCY DEPT VISIT HI MDM: CPT | Mod: 25

## 2022-08-13 PROCEDURE — 80053 COMPREHEN METABOLIC PANEL: CPT | Performed by: EMERGENCY MEDICINE

## 2022-08-13 PROCEDURE — 82553 CREATINE MB FRACTION: CPT | Performed by: INTERNAL MEDICINE

## 2022-08-13 RX ORDER — METOPROLOL SUCCINATE 50 MG/1
50 TABLET, EXTENDED RELEASE ORAL 2 TIMES DAILY
Status: DISCONTINUED | OUTPATIENT
Start: 2022-08-13 | End: 2022-08-15 | Stop reason: HOSPADM

## 2022-08-13 RX ORDER — AMIODARONE HYDROCHLORIDE 200 MG/1
200 TABLET ORAL DAILY
Status: DISCONTINUED | OUTPATIENT
Start: 2022-08-13 | End: 2022-08-15 | Stop reason: HOSPADM

## 2022-08-13 RX ORDER — CLOPIDOGREL BISULFATE 75 MG/1
75 TABLET ORAL DAILY
Status: DISCONTINUED | OUTPATIENT
Start: 2022-08-14 | End: 2022-08-15 | Stop reason: HOSPADM

## 2022-08-13 RX ORDER — FUROSEMIDE 10 MG/ML
40 INJECTION INTRAMUSCULAR; INTRAVENOUS
Status: COMPLETED | OUTPATIENT
Start: 2022-08-13 | End: 2022-08-13

## 2022-08-13 RX ORDER — ALPRAZOLAM 0.25 MG/1
0.25 TABLET ORAL 3 TIMES DAILY
Status: DISCONTINUED | OUTPATIENT
Start: 2022-08-13 | End: 2022-08-15 | Stop reason: HOSPADM

## 2022-08-13 RX ORDER — IBUPROFEN 600 MG/1
600 TABLET ORAL 4 TIMES DAILY
Status: DISCONTINUED | OUTPATIENT
Start: 2022-08-13 | End: 2022-08-15 | Stop reason: HOSPADM

## 2022-08-13 RX ORDER — RANOLAZINE 500 MG/1
1000 TABLET, EXTENDED RELEASE ORAL 2 TIMES DAILY
Status: DISCONTINUED | OUTPATIENT
Start: 2022-08-13 | End: 2022-08-15 | Stop reason: HOSPADM

## 2022-08-13 RX ORDER — FUROSEMIDE 40 MG/1
40 TABLET ORAL 2 TIMES DAILY
Status: DISCONTINUED | OUTPATIENT
Start: 2022-08-13 | End: 2022-08-15 | Stop reason: HOSPADM

## 2022-08-13 RX ORDER — ONDANSETRON 4 MG/1
4 TABLET, ORALLY DISINTEGRATING ORAL EVERY 6 HOURS PRN
Status: DISCONTINUED | OUTPATIENT
Start: 2022-08-13 | End: 2022-08-15 | Stop reason: HOSPADM

## 2022-08-13 RX ORDER — MORPHINE SULFATE 4 MG/ML
4 INJECTION, SOLUTION INTRAMUSCULAR; INTRAVENOUS
Status: COMPLETED | OUTPATIENT
Start: 2022-08-13 | End: 2022-08-13

## 2022-08-13 RX ORDER — ENOXAPARIN SODIUM 100 MG/ML
1 INJECTION SUBCUTANEOUS
Status: COMPLETED | OUTPATIENT
Start: 2022-08-13 | End: 2022-08-13

## 2022-08-13 RX ORDER — LOSARTAN POTASSIUM 50 MG/1
50 TABLET ORAL DAILY
Status: DISCONTINUED | OUTPATIENT
Start: 2022-08-14 | End: 2022-08-15 | Stop reason: HOSPADM

## 2022-08-13 RX ORDER — SODIUM CHLORIDE 0.9 % (FLUSH) 0.9 %
10 SYRINGE (ML) INJECTION
Status: DISCONTINUED | OUTPATIENT
Start: 2022-08-13 | End: 2022-08-15 | Stop reason: HOSPADM

## 2022-08-13 RX ORDER — ISOSORBIDE DINITRATE 20 MG/1
20 TABLET ORAL 3 TIMES DAILY
Status: DISCONTINUED | OUTPATIENT
Start: 2022-08-13 | End: 2022-08-15 | Stop reason: HOSPADM

## 2022-08-13 RX ORDER — ATORVASTATIN CALCIUM 10 MG/1
20 TABLET, FILM COATED ORAL DAILY
Status: DISCONTINUED | OUTPATIENT
Start: 2022-08-14 | End: 2022-08-14

## 2022-08-13 RX ORDER — LISINOPRIL 10 MG/1
10 TABLET ORAL 2 TIMES DAILY
Status: DISCONTINUED | OUTPATIENT
Start: 2022-08-13 | End: 2022-08-13

## 2022-08-13 RX ORDER — TALC
6 POWDER (GRAM) TOPICAL NIGHTLY PRN
Status: DISCONTINUED | OUTPATIENT
Start: 2022-08-13 | End: 2022-08-15 | Stop reason: HOSPADM

## 2022-08-13 RX ORDER — MAGNESIUM SULFATE 1 G/100ML
1 INJECTION INTRAVENOUS ONCE
Status: COMPLETED | OUTPATIENT
Start: 2022-08-13 | End: 2022-08-13

## 2022-08-13 RX ORDER — FAMOTIDINE 20 MG/1
20 TABLET, FILM COATED ORAL 2 TIMES DAILY
Status: DISCONTINUED | OUTPATIENT
Start: 2022-08-13 | End: 2022-08-15 | Stop reason: HOSPADM

## 2022-08-13 RX ORDER — ONDANSETRON HYDROCHLORIDE 4 MG/5ML
4 SOLUTION ORAL EVERY 6 HOURS PRN
Status: DISCONTINUED | OUTPATIENT
Start: 2022-08-13 | End: 2022-08-13

## 2022-08-13 RX ADMIN — ALPRAZOLAM 0.25 MG: 0.25 TABLET ORAL at 09:08

## 2022-08-13 RX ADMIN — FAMOTIDINE 20 MG: 20 TABLET, FILM COATED ORAL at 09:08

## 2022-08-13 RX ADMIN — NITROGLYCERIN 1 INCH: 20 OINTMENT TOPICAL at 12:08

## 2022-08-13 RX ADMIN — RANOLAZINE 1000 MG: 500 TABLET, EXTENDED RELEASE ORAL at 09:08

## 2022-08-13 RX ADMIN — MORPHINE SULFATE 4 MG: 4 INJECTION INTRAVENOUS at 01:08

## 2022-08-13 RX ADMIN — MAGNESIUM SULFATE IN DEXTROSE 1 G: 10 INJECTION, SOLUTION INTRAVENOUS at 09:08

## 2022-08-13 RX ADMIN — RIVAROXABAN 2.5 MG: 2.5 TABLET, FILM COATED ORAL at 05:08

## 2022-08-13 RX ADMIN — AMIODARONE HYDROCHLORIDE 200 MG: 200 TABLET ORAL at 05:08

## 2022-08-13 RX ADMIN — FUROSEMIDE 40 MG: 10 INJECTION, SOLUTION INTRAMUSCULAR; INTRAVENOUS at 08:08

## 2022-08-13 RX ADMIN — IBUPROFEN 600 MG: 600 TABLET, FILM COATED ORAL at 05:08

## 2022-08-13 RX ADMIN — ISOSORBIDE DINITRATE 20 MG: 20 TABLET ORAL at 09:08

## 2022-08-13 RX ADMIN — AMIODARONE HYDROCHLORIDE 0.5 MG/MIN: 1.8 INJECTION, SOLUTION INTRAVENOUS at 12:08

## 2022-08-13 RX ADMIN — IBUPROFEN 600 MG: 600 TABLET, FILM COATED ORAL at 09:08

## 2022-08-13 RX ADMIN — AMIODARONE HYDROCHLORIDE 1 MG/MIN: 1.8 INJECTION, SOLUTION INTRAVENOUS at 06:08

## 2022-08-13 RX ADMIN — METOPROLOL SUCCINATE 50 MG: 50 TABLET, EXTENDED RELEASE ORAL at 09:08

## 2022-08-13 RX ADMIN — ENOXAPARIN SODIUM 70 MG: 100 INJECTION SUBCUTANEOUS at 08:08

## 2022-08-13 RX ADMIN — AMIODARONE HYDROCHLORIDE 1 MG/MIN: 1.8 INJECTION, SOLUTION INTRAVENOUS at 08:08

## 2022-08-13 NOTE — H&P
History and Physical Examination Evaluation      Name: Tia Rivers  Age:  62 y.o.  MRN:  [id number]  Admission Date: 8/13/2022    Chief Complaint upon admit:  Chest pain; palpitations.     History of Present Illness:  62-year-old white female with extensive cardiac history which includes a h/o CAD/MI/CABG/PCI and well known to Dr. Brandon, presents to the emergency department as transfer from of [facility] for NSTEMI and persistent tachycardia.  She presented there reported chest pain, shortness of breath and palpitations overnight - she adds that the HR gets up to the 140's and is then assoc with chest pain/SOB and nausea and weakness.  Found to be in a borderline wide complex tachyarrhythmia there.  BNP markedly elevated, troponin slightly elevated, mildly hypokalemic.  She was given Lopressor IV x3, 500 mcg digoxin, amiodarone bolus and currently on drip.  Reportedly his baseline EF of 20%.  She also received aspirin 325 mg prior to transfer.  She reports no chest pain at this time. She adds that she recently suffered an MI on 7/2/2022 and underwent a PCI procedure with Dr. Brandon.  She also saw him last wk in his office.  She has an ICD.       Past Medical History:   Diagnosis Date    CHF (congestive heart failure)     Coronary artery disease     Hypertension     Pacemaker        Social History     Socioeconomic History    Marital status:    Tobacco Use    Smoking status: Current Every Day Smoker    Smokeless tobacco: Former User   Substance and Sexual Activity    Alcohol use: No       Past Surgical History:   Procedure Laterality Date    CORONARY ANGIOPLASTY WITH STENT PLACEMENT      CORONARY ARTERY BYPASS GRAFT         No family history on file.    Allergies:   Review of patient's allergies indicates:   Allergen Reactions    Iodine and iodide containing products Anaphylaxis    Codeine Nausea And Vomiting       Prior to Admission medications    Medication Sig Start Date End Date  Taking? Authorizing Provider   albuterol (PROVENTIL/VENTOLIN HFA) 90 mcg/actuation inhaler Inhale 1-2 puffs into the lungs every 6 (six) hours as needed for Wheezing. Rescue 3/9/20 3/9/21  Micelle CORNELIUS Daniel MD   albuterol-ipratropium (DUO-NEB) 2.5 mg-0.5 mg/3 mL nebulizer solution Take 3 mLs by nebulization every 6 (six) hours as needed for Wheezing. Rescue 3/9/20 3/9/21  Micelle CORNELIUS Daniel MD   ALPRAZolam (XANAX) 0.25 MG tablet Take 0.25 mg by mouth 3 (three) times daily.    Historical Provider   cimetidine (TAGAMET) 300 MG tablet cimetidine 300 mg tablet    Historical Provider   clopidogrel (PLAVIX) 75 mg tablet Take 75 mg by mouth once daily.    Historical Provider   furosemide (LASIX) 40 MG tablet furosemide 40 mg tablet   Take 1 tablet every day by oral route in the morning for 30 days.    Historical Provider   ibuprofen (ADVIL,MOTRIN) 800 MG tablet ibuprofen 800 mg tablet   Take 1 tablet twice a day by oral route for 30 days.    Historical Provider   isosorbide dinitrate (ISORDIL) 30 MG Tab Take 20 mg by mouth 3 (three) times daily.    Historical Provider   lisinopril 10 MG tablet lisinopril 10 mg tablet    Historical Provider   losartan (COZAAR) 50 MG tablet Take 50 mg by mouth.    Historical Provider   lovastatin (MEVACOR) 20 MG tablet Take 20 mg by mouth every evening.    Historical Provider   metoprolol succinate (TOPROL-XL) 50 MG 24 hr tablet metoprolol succinate ER 50 mg tablet,extended release 24 hr   Take 1 tablet every day by oral route in the morning for 30 days.    Historical Provider   ondansetron (ZOFRAN) 4 MG tablet Take 8 mg by mouth 2 (two) times daily.    Historical Provider   ranolazine (RANEXA) 500 MG Tb12 Take 1,000 mg by mouth 2 (two) times daily.    Historical Provider         Current Facility-Administered Medications:     amiodarone 360 mg/200 mL (1.8 mg/mL) infusion, 0.5 mg/min, Intravenous, Continuous, Ana Oliveira MD, Last Rate: 16.7 mL/hr at 08/13/22 1230, 0.5 mg/min at  "08/13/22 1230    melatonin tablet 6 mg, 6 mg, Oral, Nightly PRN, Ana Oliveira MD    nitroGLYCERIN 2% TD oint ointment 1 inch, 1 inch, Transdermal, Q6H, Ana Oliveira MD, 1 inch at 08/13/22 1248    sodium chloride 0.9% flush 10 mL, 10 mL, Intravenous, PRN, Ana Oliveira MD    There are no hospital problems to display for this patient.      Blood pressure (!) 101/59, pulse (!) 57, temperature 97.6 °F (36.4 °C), temperature source Oral, resp. rate (!) 1, height 4' 10" (1.473 m), weight 68 kg (150 lb), SpO2 96 %.      Review of Systems   Cardiovascular: Positive for dyspnea on exertion.   All other systems reviewed and are negative.        Physical Exam  Constitutional:       Appearance: Normal appearance. She is obese.   Cardiovascular:      Rate and Rhythm: Normal rate and regular rhythm.      Pulses: Normal pulses.      Heart sounds: Murmur heard.      Comments:  - ICD noted in L chest.   Pulmonary:      Effort: Pulmonary effort is normal.      Breath sounds: Normal breath sounds.   Abdominal:      General: Bowel sounds are normal.      Palpations: Abdomen is soft.   Musculoskeletal:         General: Normal range of motion.      Cervical back: Normal range of motion and neck supple.   Skin:     General: Skin is warm and dry.   Neurological:      General: No focal deficit present.      Mental Status: She is alert.   Psychiatric:         Mood and Affect: Mood normal.         Inpatient Diagnostics:  Recent Results (from the past 24 hour(s))   Comprehensive metabolic panel    Collection Time: 08/13/22  7:06 AM   Result Value Ref Range    Sodium Level 140 136 - 145 mmol/L    Potassium Level 4.3 3.5 - 5.1 mmol/L    Chloride 112 (H) 98 - 107 mmol/L    Carbon Dioxide 18 (L) 23 - 31 mmol/L    Glucose Level 113 82 - 115 mg/dL    Blood Urea Nitrogen 14.3 9.8 - 20.1 mg/dL    Creatinine 0.76 0.55 - 1.02 mg/dL    Calcium Level Total 9.2 8.4 - 10.2 mg/dL    Protein Total 6.1 5.8 - 7.6 gm/dL    Albumin Level 3.3 (L) 3.4 " - 4.8 gm/dL    Globulin 2.8 2.4 - 3.5 gm/dL    Albumin/Globulin Ratio 1.2 1.1 - 2.0 ratio    Bilirubin Total 0.4 <=1.5 mg/dL    Alkaline Phosphatase 93 40 - 150 unit/L    Alanine Aminotransferase 9 0 - 55 unit/L    Aspartate Aminotransferase 11 5 - 34 unit/L    eGFR >60 mls/min/1.73/m2   Magnesium    Collection Time: 08/13/22  7:06 AM   Result Value Ref Range    Magnesium Level 1.50 (L) 1.60 - 2.60 mg/dL   Troponin I    Collection Time: 08/13/22  7:06 AM   Result Value Ref Range    Troponin-I 0.195 (H) 0.000 - 0.045 ng/mL   CK-MB    Collection Time: 08/13/22  7:06 AM   Result Value Ref Range    Creatine Kinase MB 2.1 <=3.4 ng/mL   CBC with Differential    Collection Time: 08/13/22  7:06 AM   Result Value Ref Range    WBC 13.0 (H) 4.5 - 11.5 x10(3)/mcL    RBC 3.45 (L) 4.20 - 5.40 x10(6)/mcL    Hgb 11.1 (L) 12.0 - 16.0 gm/dL    Hct 35.4 (L) 37.0 - 47.0 %    .6 (H) 80.0 - 94.0 fL    MCH 32.2 (H) 27.0 - 31.0 pg    MCHC 31.4 (L) 33.0 - 36.0 mg/dL    RDW 14.8 11.5 - 17.0 %    Platelet 341 130 - 400 x10(3)/mcL    MPV 10.3 7.4 - 10.4 fL    Neut % 57.3 %    Lymph % 29.8 %    Mono % 11.1 %    Eos % 1.2 %    Basophil % 0.3 %    Lymph # 3.88 0.6 - 4.6 x10(3)/mcL    Neut # 7.5 2.1 - 9.2 x10(3)/mcL    Mono # 1.44 (H) 0.1 - 1.3 x10(3)/mcL    Eos # 0.15 0 - 0.9 x10(3)/mcL    Baso # 0.04 0 - 0.2 x10(3)/mcL    IG# 0.04 0 - 0.04 x10(3)/mcL    IG% 0.3 %    NRBC% 0.0 %   Brain natriuretic peptide    Collection Time: 08/13/22  7:06 AM   Result Value Ref Range    Natriuretic Peptide 2,450.7 (H) <=100.0 pg/mL   Troponin I    Collection Time: 08/13/22  1:42 PM   Result Value Ref Range    Troponin-I 1.194 (H) 0.000 - 0.045 ng/mL   CK-MB    Collection Time: 08/13/22  1:42 PM   Result Value Ref Range    Creatine Kinase MB 6.2 (H) <=3.4 ng/mL        Telemetry - SR in the 60's currently.     Assessment and Plan:    Tachycardia  -     EKG 12-lead; Standing    NSTEMI (non-ST elevation myocardial infarction)    NSTEMI (non-ST elevated  myocardial infarction)  -     EKG 12-lead; Standing    Other orders  -     amiodarone (CORDARONE) 360 mg/200 mL (1.8 mg/mL) infusion  -     CBC auto differential; Standing  -     Comprehensive metabolic panel; Standing  -     Magnesium; Standing  -     Troponin I; Standing  -     CK-MB; Standing  -     Brain natriuretic peptide; Standing  -     Xray Previous; Standing  -     Discontinue: amiodarone 360 mg/200 mL (1.8 mg/mL) infusion  -     Xray Previous; Standing  -     amiodarone 360 mg/200 mL (1.8 mg/mL) infusion  -     amiodarone 360 mg/200 mL (1.8 mg/mL) infusion  -     Admit to Inpatient; Standing  -     enoxaparin injection 70 mg  -     Cardiac Monitoring - Adult; Standing  -     Tele: Maintain IV access while on telemetry - Adult; Standing  -     furosemide injection 40 mg  -     nitroGLYCERIN 2% TD oint ointment 1 inch  -     Vital signs; Standing  -     Intake and output; Standing  -     Notify physician ; Standing  -     sodium chloride 0.9% flush 10 mL  -     melatonin tablet 6 mg  -     Pulse Oximetry Q4H; Standing  -     Full code; Standing  -     Dangle at bedside; Standing  -     Diet NPO; Standing  -     Troponin I; Standing  -     Cancel: Troponin I; Standing  -     CK-MB; Standing  -     morphine injection 4 mg         PLAN:  Telemetry; cont the IV amiodarone and start PO dosing  Resume home meds.  Monitor for signs of ACS with the serial cardiac enzymes and EKG's.       Ian Durand  8/13/2022

## 2022-08-13 NOTE — ED PROVIDER NOTES
Encounter Date: 8/13/2022       History     Chief Complaint   Patient presents with    Tachycardia     [place] for NSTEMI and tachycardia on amio gtt.      62-year-old female with extensive cardiac history presents to the emergency department as [place] for NSTEMI and persistent tachycardia.  She presented there reported chest pain, shortness of breath and palpitations overnight.  Found to be in a borderline wide complex tachyarrhythmia there.  BNP markedly elevated, troponin slightly elevated, mildly hypokalemic.  She was given Lopressor IV x3, 500 mcg digoxin, amiodarone bolus and currently on drip.  Reportedly has baseline EF of 20%.  She also received aspirin 325 mg prior to transfer.  She reports no chest pain at this time.    The history is provided by the patient.   Chest Pain  The current episode started just prior to arrival. The chest pain is improving. At its most intense, the chest pain is at 10/10. The chest pain is currently at 0/10. The quality of the pain is described as pressure-like. Primary symptoms include shortness of breath and palpitations. Pertinent negatives for primary symptoms include no fever, no nausea and no vomiting.   The palpitations also occurred with shortness of breath.   Pertinent negatives for associated symptoms include no weakness.   Review of patient's allergies indicates:   Allergen Reactions    Iodine and iodide containing products Anaphylaxis    Codeine Nausea And Vomiting     Past Medical History:   Diagnosis Date    CHF (congestive heart failure)     Coronary artery disease     Hypertension     Pacemaker      Past Surgical History:   Procedure Laterality Date    CORONARY ANGIOPLASTY WITH STENT PLACEMENT      CORONARY ARTERY BYPASS GRAFT       No family history on file.  Social History     Tobacco Use    Smoking status: Current Every Day Smoker    Smokeless tobacco: Former User   Substance Use Topics    Alcohol use: No      Review of Systems   Constitutional:  Negative for fever.   HENT:  Negative for sore throat.    Respiratory:  Positive for shortness of breath.    Cardiovascular:  Positive for chest pain and palpitations.   Gastrointestinal:  Negative for nausea and vomiting.   Genitourinary:  Negative for dysuria.   Musculoskeletal:  Negative for back pain.   Skin:  Negative for rash.   Neurological:  Negative for weakness.   Hematological:  Does not bruise/bleed easily.   All other systems reviewed and are negative.    Physical Exam     Initial Vitals [08/13/22 0623]   BP Pulse Resp Temp SpO2   116/75 (!) 127 16 97 °F (36.1 °C) 96 %      MAP       --         Physical Exam    Nursing note and vitals reviewed.  Constitutional: She appears well-developed and well-nourished. No distress.   HENT:   Head: Normocephalic and atraumatic.   Mouth/Throat: Oropharynx is clear and moist.   Eyes: Conjunctivae are normal. Pupils are equal, round, and reactive to light.   Neck: Neck supple. No JVD present.   Normal range of motion.  Cardiovascular:  Regular rhythm and normal heart sounds.   Tachycardia present.         No murmur heard.  Pulmonary/Chest: Breath sounds normal. No respiratory distress.   Abdominal: Abdomen is soft. She exhibits no distension. There is no abdominal tenderness.   Musculoskeletal:         General: Normal range of motion.      Cervical back: Normal range of motion and neck supple.     Neurological: She is alert and oriented to person, place, and time. GCS score is 15. GCS eye subscore is 4. GCS verbal subscore is 5. GCS motor subscore is 6.   Skin: Skin is warm and dry. No rash noted.   Psychiatric: She has a normal mood and affect.       ED Course   Critical Care    Date/Time: 8/13/2022 6:50 AM  Performed by: Ana Oliveira MD  Authorized by: Ana Oliveira MD   Direct patient critical care time: 29 minutes  Additional history critical care time: 5 minutes  Ordering / reviewing critical care time: 2  minutes  Documentation critical care time: 4 minutes  Consulting other physicians critical care time: 5 minutes  Total critical care time (exclusive of procedural time) : 45 minutes  Critical care time was exclusive of separately billable procedures and treating other patients.  Critical care was necessary to treat or prevent imminent or life-threatening deterioration of the following conditions: circulatory failure.  Critical care was time spent personally by me on the following activities: discussions with consultants, examination of patient, obtaining history from patient or surrogate, ordering and performing treatments and interventions, ordering and review of laboratory studies, ordering and review of radiographic studies and pulse oximetry.      Labs Reviewed   COMPREHENSIVE METABOLIC PANEL - Abnormal; Notable for the following components:       Result Value    Chloride 112 (*)     Carbon Dioxide 18 (*)     Albumin Level 3.3 (*)     All other components within normal limits   MAGNESIUM - Abnormal; Notable for the following components:    Magnesium Level 1.50 (*)     All other components within normal limits   TROPONIN I - Abnormal; Notable for the following components:    Troponin-I 0.195 (*)     All other components within normal limits   B-TYPE NATRIURETIC PEPTIDE - Abnormal; Notable for the following components:    Natriuretic Peptide 2,450.7 (*)     All other components within normal limits   CK-MB - Normal   CBC W/ AUTO DIFFERENTIAL    Narrative:     The following orders were created for panel order CBC auto differential.  Procedure                               Abnormality         Status                     ---------                               -----------         ------                     CBC with Differential[[id number]]                            In process                   Please view results for these tests on the individual orders.   CBC WITH DIFFERENTIAL     EKG Readings: (Independently  Interpreted)   Initial Reading: No STEMI. Heart Rate: 134. Ectopy: PACs. Conduction: RBBB. Clinical Impression: QRS Widening with RBBB   08/12/2022 @ 0648  Wide complex tachycardia   Other EKG Interpretations: 08/12/2022 @ 0900  NSR 66 bpm, st degree aV block a/ MARKUS 260 ms, T wave inversion II, III, aVF w/ ST depression in V4-6, no STEMI     Imaging Results    None          Medications   amiodarone 360 mg/200 mL (1.8 mg/mL) infusion (1 mg/min Intravenous New Bag 8/13/22 0822)   amiodarone 360 mg/200 mL (1.8 mg/mL) infusion (has no administration in time range)   nitroGLYCERIN 2% TD oint ointment 1 inch (has no administration in time range)   sodium chloride 0.9% flush 10 mL (has no administration in time range)   melatonin tablet 6 mg (has no administration in time range)   enoxaparin injection 70 mg (70 mg Subcutaneous Given 8/13/22 0818)   furosemide injection 40 mg (40 mg Intravenous Given 8/13/22 0818)     Medical Decision Making:   Initial Assessment:   Ms. Rivers was transferred here for evaluation of NSTEMI, wide complex tachycardia. Currently BP stable but still tachycardic. Discussed with cardiology who agrees with continuing amiodarone gtt, admit, trend cardiac enzymes. Findings and plan discussed with the patient and she is agreeable to admission at this time.                       Clinical Impression:   Final diagnoses:  [R00.0] Tachycardia  [I21.4] NSTEMI (non-ST elevation myocardial infarction)  [I21.4] NSTEMI (non-ST elevated myocardial infarction)  [I47.2] Wide-complex tachycardia        ED Disposition Condition    Admit                 Ana Oliveira MD  08/28/22 1088

## 2022-08-14 LAB
ANION GAP SERPL CALC-SCNC: 9 MEQ/L
BUN SERPL-MCNC: 17.6 MG/DL (ref 9.8–20.1)
CALCIUM SERPL-MCNC: 9 MG/DL (ref 8.4–10.2)
CHLORIDE SERPL-SCNC: 111 MMOL/L (ref 98–107)
CHOLEST SERPL-MCNC: 197 MG/DL
CHOLEST/HDLC SERPL: 5 {RATIO} (ref 0–5)
CK MB SERPL-MCNC: 3.7 NG/ML
CK SERPL-CCNC: 55 U/L (ref 29–168)
CO2 SERPL-SCNC: 18 MMOL/L (ref 23–31)
CREAT SERPL-MCNC: 1.07 MG/DL (ref 0.55–1.02)
CREAT/UREA NIT SERPL: 16
GFR SERPLBLD CREATININE-BSD FMLA CKD-EPI: 60 MLS/MIN/1.73/M2
GLUCOSE SERPL-MCNC: 90 MG/DL (ref 82–115)
HDLC SERPL-MCNC: 38 MG/DL (ref 35–60)
LDLC SERPL CALC-MCNC: 100 MG/DL (ref 50–140)
POTASSIUM SERPL-SCNC: 4.3 MMOL/L (ref 3.5–5.1)
SODIUM SERPL-SCNC: 138 MMOL/L (ref 136–145)
TRIGL SERPL-MCNC: 294 MG/DL (ref 37–140)
TROPONIN I SERPL-MCNC: 1.13 NG/ML (ref 0–0.04)
TSH SERPL-ACNC: 6.09 UIU/ML (ref 0.35–4.94)
VLDLC SERPL CALC-MCNC: 59 MG/DL

## 2022-08-14 PROCEDURE — 25000003 PHARM REV CODE 250: Performed by: INTERNAL MEDICINE

## 2022-08-14 PROCEDURE — 84443 ASSAY THYROID STIM HORMONE: CPT | Performed by: INTERNAL MEDICINE

## 2022-08-14 PROCEDURE — 36415 COLL VENOUS BLD VENIPUNCTURE: CPT | Performed by: INTERNAL MEDICINE

## 2022-08-14 PROCEDURE — 11000001 HC ACUTE MED/SURG PRIVATE ROOM

## 2022-08-14 PROCEDURE — 93010 ELECTROCARDIOGRAM REPORT: CPT | Mod: 77,,, | Performed by: INTERNAL MEDICINE

## 2022-08-14 PROCEDURE — 93010 EKG 12-LEAD: ICD-10-PCS | Mod: ,,, | Performed by: INTERNAL MEDICINE

## 2022-08-14 PROCEDURE — 82550 ASSAY OF CK (CPK): CPT | Performed by: INTERNAL MEDICINE

## 2022-08-14 PROCEDURE — 82553 CREATINE MB FRACTION: CPT | Performed by: INTERNAL MEDICINE

## 2022-08-14 PROCEDURE — 21400001 HC TELEMETRY ROOM

## 2022-08-14 PROCEDURE — 93005 ELECTROCARDIOGRAM TRACING: CPT

## 2022-08-14 PROCEDURE — 80048 BASIC METABOLIC PNL TOTAL CA: CPT | Performed by: INTERNAL MEDICINE

## 2022-08-14 PROCEDURE — 93010 ELECTROCARDIOGRAM REPORT: CPT | Mod: ,,, | Performed by: INTERNAL MEDICINE

## 2022-08-14 PROCEDURE — 93010 EKG 12-LEAD: ICD-10-PCS | Mod: 77,,, | Performed by: INTERNAL MEDICINE

## 2022-08-14 PROCEDURE — 84484 ASSAY OF TROPONIN QUANT: CPT | Performed by: INTERNAL MEDICINE

## 2022-08-14 PROCEDURE — 80061 LIPID PANEL: CPT | Performed by: INTERNAL MEDICINE

## 2022-08-14 RX ORDER — LEVOTHYROXINE SODIUM 88 UG/1
88 TABLET ORAL
Status: DISCONTINUED | OUTPATIENT
Start: 2022-08-15 | End: 2022-08-15 | Stop reason: HOSPADM

## 2022-08-14 RX ORDER — ATORVASTATIN CALCIUM 40 MG/1
40 TABLET, FILM COATED ORAL DAILY
Status: DISCONTINUED | OUTPATIENT
Start: 2022-08-15 | End: 2022-08-15 | Stop reason: HOSPADM

## 2022-08-14 RX ADMIN — RIVAROXABAN 2.5 MG: 2.5 TABLET, FILM COATED ORAL at 08:08

## 2022-08-14 RX ADMIN — RANOLAZINE 1000 MG: 500 TABLET, EXTENDED RELEASE ORAL at 08:08

## 2022-08-14 RX ADMIN — METOPROLOL SUCCINATE 50 MG: 50 TABLET, EXTENDED RELEASE ORAL at 07:08

## 2022-08-14 RX ADMIN — AMIODARONE HYDROCHLORIDE 200 MG: 200 TABLET ORAL at 08:08

## 2022-08-14 RX ADMIN — ATORVASTATIN CALCIUM 20 MG: 10 TABLET, FILM COATED ORAL at 08:08

## 2022-08-14 RX ADMIN — ALPRAZOLAM 0.25 MG: 0.25 TABLET ORAL at 07:08

## 2022-08-14 RX ADMIN — CLOPIDOGREL 75 MG: 75 TABLET, FILM COATED ORAL at 08:08

## 2022-08-14 RX ADMIN — RANOLAZINE 1000 MG: 500 TABLET, EXTENDED RELEASE ORAL at 07:08

## 2022-08-14 RX ADMIN — FAMOTIDINE 20 MG: 20 TABLET, FILM COATED ORAL at 08:08

## 2022-08-14 RX ADMIN — IBUPROFEN 600 MG: 600 TABLET, FILM COATED ORAL at 07:08

## 2022-08-14 RX ADMIN — RIVAROXABAN 2.5 MG: 2.5 TABLET, FILM COATED ORAL at 05:08

## 2022-08-14 RX ADMIN — FAMOTIDINE 20 MG: 20 TABLET, FILM COATED ORAL at 07:08

## 2022-08-14 NOTE — PROGRESS NOTES
Cardiology Daily Progress Note    Patient Name: Tia Rivers  Age: 62 y.o.  : 1960  MRN: [id number]  Admission Date: 2022  Today's Date: 2022     Subjective:  Patient feeling better today.  No new cardiovascular issues reported.  She has been ambulating in her room without any issues or complaints she adds that the palpitations have resolved along with the associated nausea issues.  Telemetry monitor continues to show fluctuations between sinus rhythm atrial flutter and sinus bradycardia.  She has also had some bouts of atrial ventricular pacing and some wide complex tachycardia that is with which she has been asymptomatic.      Scheduled Meds:   ALPRAZolam  0.25 mg Oral TID    amiodarone  200 mg Oral Daily    atorvastatin  20 mg Oral Daily    clopidogreL  75 mg Oral Daily    famotidine  20 mg Oral BID    furosemide  40 mg Oral BID    ibuprofen  600 mg Oral QID    isosorbide dinitrate  20 mg Oral TID    losartan  50 mg Oral Daily    metoprolol succinate  50 mg Oral BID    nitroGLYCERIN 2% TD oint  1 inch Transdermal Q6H    ranolazine  1,000 mg Oral BID    rivaroxaban  2.5 mg Oral BID WM       Continuous Infusions:   amiodarone in dextrose 5% 0.5 mg/min (22 1230)       PRN Meds:.melatonin, ondansetron, sodium chloride 0.9%      Objective:   Vitals:    22 2151 22 0305 22 0824 22 1148   BP: (!) 101/59 95/61 103/61 104/66   Pulse: 79 (!) 52 62 63   Resp:   16 16   Temp:  97.6 °F (36.4 °C) 97.7 °F (36.5 °C) 97.8 °F (36.6 °C)   TempSrc:  Oral Oral Oral   SpO2:  96% (!) 94% 95%   Weight:       Height:           Intake/Output Summary (Last 24 hours) at 2022 1559  Last data filed at 2022 1400  Gross per 24 hour   Intake 480 ml   Output --   Net 480 ml     Wt Readings from Last 3 Encounters:   22 68 kg (150 lb)   10/07/21 72.5 kg (159 lb 13.3 oz)   20 77.1 kg (170 lb)          Physical Exam      Laboratory:   No results found for:  [id number]    Recent Labs   Lab 08/13/22  0706   WBC 13.0*   HGB 11.1*   HCT 35.4*        Recent Labs   Lab 08/13/22  0706 08/14/22  0443    138   K 4.3 4.3   CO2 18* 18*   BUN 14.3 17.6   CREATININE 0.76 1.07*   CALCIUM 9.2 9.0   MG 1.50*  --      Recent Labs   Lab 08/13/22  0706   ALKPHOS 93   ALT 9   AST 11   ALBUMIN 3.3*   BILITOT 0.4      Recent Labs     08/13/22  0706 08/13/22  1342 08/14/22  0443   BNP 2,450.7*  --   --    TROPONINI 0.195* 1.194* 1.131*   CPK  --   --  55       All labs within the last 24 hours were reviewed.     Microbiology:   Microbiology Results (last 7 days)     ** No results found for the last 168 hours. **          Imaging:    ECG Results    None         No results found for this or any previous visit.      US Lower Extremity Veins Left  Narrative: EXAMINATION:  US LOWER EXTREMITY VEINS LEFT    CLINICAL HISTORY:  Localized edema    TECHNIQUE:  Duplex and color flow Doppler evaluation and graded compression of the left lower extremity veins was performed.    COMPARISON:  None    FINDINGS:  Left thigh veins: The common femoral, femoral, popliteal, upper greater saphenous, and deep femoral veins are patent and free of thrombus. The veins are normally compressible and have normal phasic flow and augmentation response.    Left calf veins: The visualized calf veins are patent.    Contralateral CFV: The contralateral (right) common femoral vein is patent and free of thrombus.    Miscellaneous: None  Impression: No evidence of deep venous thrombosis in the left lower extremity.    Electronically signed by: Preet Durham MD  Date:    03/09/2020  Time:    09:41  X-Ray Chest AP Portable  Narrative: EXAMINATION:  XR CHEST AP PORTABLE    CLINICAL HISTORY:  shortness of breath;    TECHNIQUE:  Single frontal view of the chest was performed.    COMPARISON:  02/28/2020    FINDINGS:  Cardiac monitoring leads overlie the chest.  There is a left chest wall cardiac pacing device present.   There is postoperative change of prior median sternotomy.  Cardiomediastinal silhouette is mildly enlarged, similar to prior examination.  Lungs are symmetrically expanded without evidence of confluent airspace consolidation or pleural effusion.  There is no evidence of pneumothorax.  Osseous structures demonstrate stable degenerative changes.  Impression: No radiographic evidence of acute intrathoracic process.    Electronically signed by: Brennan Painter MD  Date:    03/09/2020  Time:    07:04      Telemetry:   Sinus rhythm currently at 66 beats per minute.      Assessment:     Active Hospital Problems    Diagnosis  POA    *PSVT (paroxysmal supraventricular tachycardia) [I47.1]  Yes     Chronic    Benign essential hypertension [I10]  Yes    Implantable cardioverter-defibrillator (ICD) in situ [Z95.810]  Yes    Hx of CABG [Z95.1]  Not Applicable     Chronic    History of PTCA [Z98.61]  Not Applicable     Chronic    History of MI (myocardial infarction) [I25.2]  Not Applicable     Chronic    Atypical angina [I20.8]  Yes     Chronic    NSTEMI (non-ST elevated myocardial infarction) [I21.4]  Yes    Chronic systolic congestive heart failure [I50.22]  Yes     Last Assessment & Plan:   Formatting of this note might be different from the original.  With recent exacerbation. Volume appears stable.      Cardiomyopathy [I42.9]  Yes     Last Assessment & Plan:   Formatting of this note might be different from the original.  Post AICD placement      Arteriosclerosis of coronary artery [I25.10]  Yes     Last Assessment & Plan:   Formatting of this note might be different from the original.  Post unsuccessful PCI.        Resolved Hospital Problems   No resolved problems to display.          Plan:         At this point I have appears that she has suffered from a type 2 non ST segment elevation myocardial infarction or demand ischemia.  There is no need to go to the cath lab at this point.  She had recently undergone a  procedure with Dr. Brandon.  Will continue to optimize the medical regimen order to help maintain the sinus rhythm.  Once stable, will arrange for discharge home with further follow-up with Dr. Brandon.  She otherwise appears to be clinically stable from a cardiac standpoint.  We will continue to monitor progress closely and hope for discharge home the next day or so.

## 2022-08-15 VITALS
RESPIRATION RATE: 16 BRPM | HEIGHT: 58 IN | BODY MASS INDEX: 31.49 KG/M2 | HEART RATE: 116 BPM | WEIGHT: 150 LBS | SYSTOLIC BLOOD PRESSURE: 126 MMHG | OXYGEN SATURATION: 94 % | TEMPERATURE: 98 F | DIASTOLIC BLOOD PRESSURE: 82 MMHG

## 2022-08-15 PROCEDURE — 25000003 PHARM REV CODE 250: Performed by: INTERNAL MEDICINE

## 2022-08-15 RX ORDER — AMIODARONE HYDROCHLORIDE 200 MG/1
200 TABLET ORAL DAILY
Qty: 30 TABLET | Refills: 11 | Status: SHIPPED | OUTPATIENT
Start: 2022-08-16 | End: 2023-08-16

## 2022-08-15 RX ORDER — LEVOTHYROXINE SODIUM 88 UG/1
88 TABLET ORAL
Qty: 30 TABLET | Refills: 11 | Status: SHIPPED | OUTPATIENT
Start: 2022-08-16 | End: 2023-08-16

## 2022-08-15 RX ADMIN — RIVAROXABAN 2.5 MG: 2.5 TABLET, FILM COATED ORAL at 09:08

## 2022-08-15 RX ADMIN — CLOPIDOGREL 75 MG: 75 TABLET, FILM COATED ORAL at 09:08

## 2022-08-15 RX ADMIN — METOPROLOL SUCCINATE 50 MG: 50 TABLET, EXTENDED RELEASE ORAL at 09:08

## 2022-08-15 RX ADMIN — RANOLAZINE 1000 MG: 500 TABLET, EXTENDED RELEASE ORAL at 09:08

## 2022-08-15 RX ADMIN — ALPRAZOLAM 0.25 MG: 0.25 TABLET ORAL at 09:08

## 2022-08-15 RX ADMIN — IBUPROFEN 600 MG: 600 TABLET, FILM COATED ORAL at 12:08

## 2022-08-15 RX ADMIN — AMIODARONE HYDROCHLORIDE 200 MG: 200 TABLET ORAL at 09:08

## 2022-08-15 RX ADMIN — FAMOTIDINE 20 MG: 20 TABLET, FILM COATED ORAL at 09:08

## 2022-08-15 RX ADMIN — LEVOTHYROXINE SODIUM 88 MCG: 88 TABLET ORAL at 05:08

## 2022-08-15 NOTE — DISCHARGE SUMMARY
[facility] - [facility] Telemetry  Discharge Note  Short Stay    * No surgery found *    OUTCOME: Patient tolerated treatment/procedure well without complication and is now ready for discharge.    DISPOSITION: Home or Self Care    FINAL DIAGNOSIS:  PSVT (paroxysmal supraventricular tachycardia)    FOLLOWUP: In clinic    DISCHARGE INSTRUCTIONS:    Discharge Procedure Orders   Diet Cardiac     Activity as tolerated        TIME SPENT ON DISCHARGE: 60 minutes

## 2022-08-15 NOTE — PLAN OF CARE
08/15/22 0853   Discharge Assessment   Assessment Type Discharge Planning Assessment   Confirmed/corrected address, phone number and insurance Yes   Confirmed Demographics Correct on Facesheet   Source of Information patient   Communicated KORTNEY with patient/caregiver Yes   Lives With alone  (Currently living with daughter in [place])   Do you expect to return to your current living situation? Yes   Do you have help at home or someone to help you manage your care at home? Yes   Prior to hospitilization cognitive status: Alert/Oriented   Current cognitive status: Alert/Oriented   Walking or Climbing Stairs Difficulty none   Dressing/Bathing Difficulty none   Home Accessibility wheelchair accessible   Home Layout Able to live on 1st floor   Equipment Currently Used at Home none   Readmission within 30 days? No   Patient currently being followed by outpatient case management? No   Do you currently have service(s) that help you manage your care at home? No   Do you take prescription medications? Yes   Do you have prescription coverage? Yes   Do you have any problems affording any of your prescribed medications? No   Is the patient taking medications as prescribed? yes   How do you get to doctors appointments? car, drives self   Are you on dialysis? No   Do you take coumadin? No   Discharge Plan A Home with family   Discharge Plan B Home   DME Needed Upon Discharge  none   Discharge Plan discussed with: Patient   Discharge Barriers Identified None     Patient reports currently active with [facility].

## 2022-08-15 NOTE — DISCHARGE SUMMARY
Tia Rivers  1960 62 y.o. female     MRN NUMBER: [id number]      ADMISSION DATE:8/13/2022    DISCHARGE DATE:  8/15/2022      HOSPITAL COURSE:  The patient continues to do well today.  She is awake alert and comfortable offering no problems or complaints.  She has other cardiac symptoms overall improved and she is back to her baseline.  She denies chest pain shortness of breath or palpitations and has been ambulating throughout the room and in the hallways without any difficulties.  She is anticipated to be discharged home today.         ALPRAZolam  0.25 mg Oral TID    amiodarone  200 mg Oral Daily    atorvastatin  40 mg Oral Daily    clopidogreL  75 mg Oral Daily    famotidine  20 mg Oral BID    furosemide  40 mg Oral BID    ibuprofen  600 mg Oral QID    isosorbide dinitrate  20 mg Oral TID    levothyroxine  88 mcg Oral Before breakfast    losartan  50 mg Oral Daily    metoprolol succinate  50 mg Oral BID    nitroGLYCERIN 2% TD oint  1 inch Transdermal Q6H    ranolazine  1,000 mg Oral BID    rivaroxaban  2.5 mg Oral BID WM   :      Discharge Meds:       Medication List      ASK your doctor about these medications    albuterol 90 mcg/actuation inhaler  Commonly known as: PROVENTIL/VENTOLIN HFA  Inhale 1-2 puffs into the lungs every 6 (six) hours as needed for Wheezing. Rescue     albuterol-ipratropium 2.5 mg-0.5 mg/3 mL nebulizer solution  Commonly known as: DUO-NEB  Take 3 mLs by nebulization every 6 (six) hours as needed for Wheezing. Rescue     cimetidine 300 MG tablet  Commonly known as: TAGAMET     clopidogreL 75 mg tablet  Commonly known as: PLAVIX     furosemide 40 MG tablet  Commonly known as: LASIX     ibuprofen 800 MG tablet  Commonly known as: ADVIL,MOTRIN     isosorbide dinitrate 30 MG Tab  Commonly known as: ISORDIL     lisinopriL 10 MG tablet     losartan 50 MG tablet  Commonly known as: COZAAR     lovastatin 20 MG tablet  Commonly known as: MEVACOR     metoprolol succinate 50 MG 24  hr tablet  Commonly known as: TOPROL-XL     ondansetron 4 MG tablet  Commonly known as: ZOFRAN     ranolazine 500 MG Tb12  Commonly known as: RANEXA     XANAX 0.25 MG tablet  Generic drug: ALPRAZolam             Continuing home medications:        Physical exam:    Objective:  Vitals:    08/15/22 1125   BP: 123/79   Pulse: 65   Resp:    Temp: 97.8 °F (36.6 °C)       Intake/Output Summary (Last 24 hours) at 8/15/2022 1323  Last data filed at 8/14/2022 1400  Gross per 24 hour   Intake 480 ml   Output --   Net 480 ml       Physical Exam  Constitutional:       Appearance: Normal appearance.   Cardiovascular:      Rate and Rhythm: Normal rate and regular rhythm.      Pulses: Normal pulses.      Heart sounds: Murmur heard.   Pulmonary:      Effort: Pulmonary effort is normal.      Breath sounds: Normal breath sounds.   Abdominal:      General: Bowel sounds are normal.      Palpations: Abdomen is soft.   Musculoskeletal:         General: Normal range of motion.      Cervical back: Normal range of motion and neck supple.   Skin:     General: Skin is warm and dry.   Neurological:      General: No focal deficit present.      Mental Status: She is alert and oriented to person, place, and time.   Psychiatric:         Mood and Affect: Mood normal.         Behavior: Behavior normal.             No components found for: CHEMISTRY   .  Recent Labs   Lab 08/13/22  0706   WBC 13.0*   HGB 11.1*   HCT 35.4*            Recent Labs   Lab 08/13/22  0706 08/14/22 0443    138   K 4.3 4.3   CO2 18* 18*   BUN 14.3 17.6   CREATININE 0.76 1.07*   CALCIUM 9.2 9.0   BILITOT 0.4  --    ALKPHOS 93  --    ALT 9  --    AST 11  --    GLUCOSE 113 90      Recent Labs   Lab 08/14/22 0443   CHOL 197      Recent Labs   Lab 08/14/22 0443   CHOL 197    No results for input(s): DIGOXIN in the last 168 hours. No results for input(s): APTT, INR, PTT in the last 168 hours.   Recent Labs   Lab 08/14/22 0443   CPK 55   TROPONINI 1.131*   CPKMB  3.7*        Recent Labs   Lab 08/14/22  0443   TSH 6.0906*    No results found for: PSA, PSADIAG, PSATOTAL, PSAFREE, PSAFREEPCT No results for input(s): RETICULOCYTE in the last 168 hours.     RESULTS  No results found.     Discharge Diagnosis:  Problem List Items Addressed This Visit        Cardiac/Vascular    * (Principal) PSVT (paroxysmal supraventricular tachycardia) (Chronic)    Relevant Orders    EKG 12-lead (Completed)    NSTEMI (non-ST elevated myocardial infarction)    Relevant Orders    EKG 12-lead (Completed)      Other Visit Diagnoses     Tachycardia        Relevant Orders    EKG 12-lead (Completed)    NSTEMI (non-ST elevation myocardial infarction)        CAD (coronary artery disease)        Relevant Orders    EKG 12-lead (Completed)    EKG 12-lead (Completed)    CHF with cardiomyopathy        Relevant Orders    Cardiac device check - In Clinic & Hospital             Plan:    The patient will be arranged discharge home today and follow up with Dr. Brandon in the near future.  Should be discharged home to continue on the same medications as on admission and in addition, she will be continued on amiodarone daily.  I have asked her to call or return should she have any further problems or complaints.

## 2022-08-15 NOTE — PLAN OF CARE
Problem: Adult Inpatient Plan of Care  Goal: Plan of Care Review  Outcome: Ongoing, Progressing  Goal: Patient-Specific Goal (Individualized)  Outcome: Ongoing, Progressing  Goal: Absence of Hospital-Acquired Illness or Injury  Outcome: Ongoing, Progressing     Problem: Chest Pain  Goal: Resolution of Chest Pain Symptoms  Outcome: Ongoing, Progressing  Intervention: Manage Acute Chest Pain  Flowsheets (Taken 8/15/2022 6384)  Chest Pain Intervention: cardiac monitoring continued

## 2022-08-15 NOTE — PLAN OF CARE
08/15/22 1420   Final Note   Anticipated Discharge Disposition Home-Health   Post-Acute Status   Post-Acute Authorization Home Health   Home Health Status Set-up Complete/Auth obtained  (Resume [facility])

## 2022-08-17 ENCOUNTER — PATIENT OUTREACH (OUTPATIENT)
Dept: ADMINISTRATIVE | Facility: CLINIC | Age: 62
End: 2022-08-17
Payer: MEDICAID

## 2022-08-17 NOTE — PROGRESS NOTES
C3 nurse spoke with Tia Rivers for a TCC post hospital discharge follow up call, call completed. The patient has a scheduled HOSFU appointment with Dr. Brandon on 8/26.

## 2022-08-18 ENCOUNTER — HOSPITAL ENCOUNTER (EMERGENCY)
Facility: HOSPITAL | Age: 62
Discharge: HOME | End: 2022-08-18
Attending: INTERNAL MEDICINE
Payer: MEDICAID

## 2022-08-18 VITALS
WEIGHT: 154.13 LBS | DIASTOLIC BLOOD PRESSURE: 64 MMHG | BODY MASS INDEX: 32.35 KG/M2 | OXYGEN SATURATION: 98 % | HEIGHT: 58 IN | TEMPERATURE: 98 F | SYSTOLIC BLOOD PRESSURE: 126 MMHG | RESPIRATION RATE: 22 BRPM | HEART RATE: 60 BPM

## 2022-08-18 DIAGNOSIS — I74.5 ILIAC ARTERY OCCLUSION, RIGHT: Primary | ICD-10-CM

## 2022-08-18 LAB
ABS NEUT CALC (OHS): 4.28 X10(3)/MCL (ref 2.1–9.2)
ALBUMIN SERPL-MCNC: 3.4 GM/DL (ref 3.4–4.8)
ALBUMIN/GLOB SERPL: 0.9 RATIO (ref 1.1–2)
ALP SERPL-CCNC: 103 UNIT/L (ref 40–150)
ALT SERPL-CCNC: 9 UNIT/L (ref 0–55)
APTT PPP: 108.9 SECONDS
AST SERPL-CCNC: 13 UNIT/L (ref 5–34)
BASOPHILS NFR BLD MANUAL: 0.09 X10(3)/MCL (ref 0–0.2)
BASOPHILS NFR BLD MANUAL: 1 % (ref 0–2)
BILIRUBIN DIRECT+TOT PNL SERPL-MCNC: 0.4 MG/DL
BUN SERPL-MCNC: 11.9 MG/DL (ref 9.8–20.1)
CALCIUM SERPL-MCNC: 10.2 MG/DL (ref 8.4–10.2)
CHLORIDE SERPL-SCNC: 109 MMOL/L (ref 98–107)
CO2 SERPL-SCNC: 20 MMOL/L (ref 23–31)
CREAT SERPL-MCNC: 0.84 MG/DL (ref 0.55–1.02)
EOSINOPHIL NFR BLD MANUAL: 0.19 X10(3)/MCL (ref 0–0.9)
EOSINOPHIL NFR BLD MANUAL: 2 % (ref 0–8)
ERYTHROCYTE [DISTWIDTH] IN BLOOD BY AUTOMATED COUNT: 14.4 % (ref 11.5–17)
GFR SERPLBLD CREATININE-BSD FMLA CKD-EPI: >60 MLS/MIN/1.73/M2
GLOBULIN SER-MCNC: 3.6 GM/DL (ref 2.4–3.5)
GLUCOSE SERPL-MCNC: 94 MG/DL (ref 82–115)
HCT VFR BLD AUTO: 37.6 % (ref 37–47)
HGB BLD-MCNC: 11.7 GM/DL (ref 12–16)
IMM GRANULOCYTES # BLD AUTO: 0.03 X10(3)/MCL (ref 0–0.04)
IMM GRANULOCYTES NFR BLD AUTO: 0.3 %
LYMPH ABN # BLD MANUAL: 4 %
LYMPHOCYTES NFR BLD MANUAL: 3.81 X10(3)/MCL
LYMPHOCYTES NFR BLD MANUAL: 41 % (ref 13–40)
MCH RBC QN AUTO: 32.3 PG (ref 27–31)
MCHC RBC AUTO-ENTMCNC: 31.1 MG/DL (ref 33–36)
MCV RBC AUTO: 103.9 FL (ref 80–94)
MONOCYTES NFR BLD MANUAL: 0.56 X10(3)/MCL (ref 0.1–1.3)
MONOCYTES NFR BLD MANUAL: 6 % (ref 2–11)
NEUTROPHILS NFR BLD MANUAL: 46 % (ref 47–80)
NRBC BLD AUTO-RTO: 0 %
PLATELET # BLD AUTO: 406 X10(3)/MCL (ref 130–400)
PLATELET # BLD EST: ABNORMAL 10*3/UL
PMV BLD AUTO: 10.1 FL (ref 7.4–10.4)
POTASSIUM SERPL-SCNC: 4.7 MMOL/L (ref 3.5–5.1)
PROT SERPL-MCNC: 7 GM/DL (ref 5.8–7.6)
RBC # BLD AUTO: 3.62 X10(6)/MCL (ref 4.2–5.4)
RBC MORPH BLD: NORMAL
SARS-COV-2 RDRP RESP QL NAA+PROBE: NEGATIVE
SODIUM SERPL-SCNC: 141 MMOL/L (ref 136–145)
WBC # SPEC AUTO: 9.3 X10(3)/MCL (ref 4.5–11.5)

## 2022-08-18 PROCEDURE — 36415 COLL VENOUS BLD VENIPUNCTURE: CPT | Performed by: INTERNAL MEDICINE

## 2022-08-18 PROCEDURE — 85730 THROMBOPLASTIN TIME PARTIAL: CPT | Performed by: INTERNAL MEDICINE

## 2022-08-18 PROCEDURE — 87635 SARS-COV-2 COVID-19 AMP PRB: CPT | Performed by: INTERNAL MEDICINE

## 2022-08-18 PROCEDURE — 85610 PROTHROMBIN TIME: CPT | Performed by: INTERNAL MEDICINE

## 2022-08-18 PROCEDURE — 80053 COMPREHEN METABOLIC PANEL: CPT | Performed by: INTERNAL MEDICINE

## 2022-08-18 PROCEDURE — 99284 EMERGENCY DEPT VISIT MOD MDM: CPT | Mod: 25

## 2022-08-18 PROCEDURE — 85027 COMPLETE CBC AUTOMATED: CPT | Performed by: INTERNAL MEDICINE

## 2022-08-18 RX ORDER — HEPARIN SODIUM 10000 [USP'U]/100ML
18 INJECTION, SOLUTION INTRAVENOUS
Status: DISCONTINUED | OUTPATIENT
Start: 2022-08-18 | End: 2022-08-18 | Stop reason: HOSPADM

## 2022-08-19 NOTE — ED PROVIDER NOTES
Encounter Date: 2022       History     Chief Complaint   Patient presents with    Femoral Occlusion     Transfer from [facility] for occluded right common femoral artery. C/o right leg and foot pain.     Transfer from [facility] for vascular surgery due to Rt leg femoral artery occlusion. Pt with Hx of HTN, CAD, recently have an MI requiring an impella placement on her Rt groin, pt states problems with the leg after discharge. At [facility] US reveal Occlusion of Rt common femoral artery with monophasic waveform on the entire leg.     The history is provided by the patient.     Review of patient's allergies indicates:   Allergen Reactions    Iodine and iodide containing products Anaphylaxis    Codeine Nausea And Vomiting     Past Medical History:   Diagnosis Date    CHF (congestive heart failure)     Coronary artery disease     Hypertension     Pacemaker      Past Surgical History:   Procedure Laterality Date    CORONARY ANGIOPLASTY WITH STENT PLACEMENT      CORONARY ARTERY BYPASS GRAFT       History reviewed. No pertinent family history.  Social History     Tobacco Use    Smoking status: Former Smoker     Quit date: 2022     Years since quittin.1    Smokeless tobacco: Former User   Substance Use Topics    Alcohol use: No     Review of Systems   Constitutional: Negative for fever.   HENT: Negative for sore throat.    Respiratory: Negative for shortness of breath.    Cardiovascular: Negative for chest pain.   Gastrointestinal: Negative for nausea.   Genitourinary: Negative for dysuria.   Musculoskeletal: Negative for back pain.   Skin: Negative for rash.   Neurological: Negative for weakness.   Hematological: Does not bruise/bleed easily.   All other systems reviewed and are negative.      Physical Exam     Initial Vitals [22]   BP Pulse Resp Temp SpO2   (!) 159/81 63 16 98 °F (36.7 °C) 99 %      MAP       --         Physical Exam    Nursing note and vitals  reviewed.  Constitutional: She appears well-developed and well-nourished.   HENT:   Head: Normocephalic and atraumatic.   Mouth/Throat: Oropharynx is clear and moist.   Eyes: Conjunctivae are normal. Pupils are equal, round, and reactive to light.   Neck: Neck supple.   Normal range of motion.  Cardiovascular: Normal rate, regular rhythm and normal heart sounds.   Non palpable pulses on Rt foot but doppler posterior tibialis present   Pulmonary/Chest: Breath sounds normal.   Abdominal: Abdomen is soft. Bowel sounds are normal. She exhibits no distension. There is no abdominal tenderness. There is no rebound and no guarding.   Musculoskeletal:         General: Tenderness (Right foot) present. No edema. Normal range of motion.      Cervical back: Normal range of motion and neck supple.     Neurological: She is alert and oriented to person, place, and time. She has normal strength.   Skin: Skin is warm and dry. No rash noted.   Psychiatric: Her behavior is normal.         ED Course   Procedures  Labs Reviewed   COMPREHENSIVE METABOLIC PANEL - Abnormal; Notable for the following components:       Result Value    Chloride 109 (*)     Carbon Dioxide 20 (*)     Globulin 3.6 (*)     Albumin/Globulin Ratio 0.9 (*)     All other components within normal limits   CBC WITH DIFFERENTIAL - Abnormal; Notable for the following components:    RBC 3.62 (*)     Hgb 11.7 (*)     .9 (*)     MCH 32.3 (*)     MCHC 31.1 (*)     Platelet 406 (*)     All other components within normal limits   MANUAL DIFFERENTIAL - Abnormal; Notable for the following components:    Neut Man 46 (*)     Lymph Man 41 (*)     Platelet Est Increased (*)     All other components within normal limits   APTT - Normal   SARS-COV-2 RNA AMPLIFICATION, QUAL - Normal   CBC W/ AUTO DIFFERENTIAL    Narrative:     The following orders were created for panel order CBC auto differential.  Procedure                               Abnormality         Status                      ---------                               -----------         ------                     CBC with Differential[[id number]]        Abnormal            Final result               Manual Differential[[id number]]          Abnormal            Final result                 Please view results for these tests on the individual orders.   PROTIME-INR   EXTRA TUBES    Narrative:     The following orders were created for panel order EXTRA TUBES.  Procedure                               Abnormality         Status                     ---------                               -----------         ------                     Red Top Hold[[id number]]                                     In process                   Please view results for these tests on the individual orders.   RED TOP HOLD          Imaging Results    None          Medications   heparin 25,000 units in dextrose 5% 250 mL (100 units/mL) infusion (heparin infusion - NO NOMOGRAM) (has no administration in time range)       9:14 PM Consult: I discussed the case with Dr. Gutierrez (Surgery service with Dr. Valle Staff for Vascular surgery). Agrees with current management. Recommends discharge home, pt stable with perfusion on the lower extremity, have an appointment with her interventional cardiologist tomorrow AM.                      Clinical Impression:   Final diagnoses:  [I74.5] Iliac artery occlusion, right (Primary)          ED Disposition Condition    Discharge Stable        ED Prescriptions     None        Follow-up Information     Follow up With Specialties Details Why Contact Info    [facility] - Emergency Dept Emergency Medicine  If symptoms worsen [address] [address] [phone]    [facility]   As needed [address] [address] [phone]           Danish Lopez MD  08/18/22 2121

## 2022-08-19 NOTE — CONSULTS
[facility] General Surgery Service  CONSULT NOTE  Date: 2022    CC: Concern for RLE ischemia    SUBJECTIVE    HPI:   Tia Rivers is a 62 y.o. female with a Hx of CAD, ICD placement, CHF s/p CABG 23 years ago, ischemic cardiomyopathy,  CKD-2, COPD, RCA STEMI on 22 c/b cardiogenic shock with acute ischemic mitral regurgitation s/p Impella and damaris-clip placement (). She was admitted on 8/15/22 at [facility] with an NSTEMI with persistent borderline wide complex tachyarrhythmia requiring an amiodarone drip. After discharge, she presented to [place]  c/o leg pain and US at their ED showed occlusion of the Rt femoral artery with monophasic waveforms of the entire R leg. She was then transferred to [facility] for further vascular evaluation.    She reports R leg claudication and weakness.She has intermittent R leg pain that lasts throughout most of the day. Endorses paresthesias and and weakness. She reports experiencing chronic R leg pain since prior to her recent admission, but notes it has worsened since she was discharged. Denies rest pain, loss of sensation or motor function, discoloration/pallor, poikilothermia. Denies f/c, N/V/D, CP, SOB.     ROS:  As stated above, otherwise negative.    PMH:   Past Medical History:   Diagnosis Date    CHF (congestive heart failure)     Coronary artery disease     Hypertension     Pacemaker        PSH:   Past Surgical History:   Procedure Laterality Date    CORONARY ANGIOPLASTY WITH STENT PLACEMENT      CORONARY ARTERY BYPASS GRAFT         FamHx:   History reviewed. No pertinent family history.    SocHx:  Social History     Socioeconomic History    Marital status:    Tobacco Use    Smoking status: Former Smoker     Quit date: 2022     Years since quittin.1    Smokeless tobacco: Former User   Substance and Sexual Activity    Alcohol use: No       Allergies:   Review of patient's allergies indicates:   Allergen Reactions    Iodine and iodide containing  products Anaphylaxis    Codeine Nausea And Vomiting       Medications:  No current facility-administered medications on file prior to encounter.     Current Outpatient Medications on File Prior to Encounter   Medication Sig Dispense Refill    albuterol (PROVENTIL/VENTOLIN HFA) 90 mcg/actuation inhaler Inhale 1-2 puffs into the lungs every 6 (six) hours as needed for Wheezing. Rescue 18 g 2    albuterol-ipratropium (DUO-NEB) 2.5 mg-0.5 mg/3 mL nebulizer solution Take 3 mLs by nebulization every 6 (six) hours as needed for Wheezing. Rescue 1 Box 0    ALPRAZolam (XANAX) 0.25 MG tablet Take 0.25 mg by mouth 2 (two) times a day.      amiodarone (PACERONE) 200 MG Tab Take 1 tablet (200 mg total) by mouth once daily. 30 tablet 11    cimetidine (TAGAMET) 300 MG tablet cimetidine 300 mg tablet      clopidogrel (PLAVIX) 75 mg tablet Take 75 mg by mouth once daily.      furosemide (LASIX) 40 MG tablet furosemide 40 mg tablet   Take 1 tablet every day by oral route in the morning for 30 days.      ibuprofen (ADVIL,MOTRIN) 800 MG tablet ibuprofen 800 mg tablet   Take 1 tablet twice a day by oral route for 30 days.      isosorbide dinitrate (ISORDIL) 30 MG Tab Take 20 mg by mouth 3 (three) times daily.      levothyroxine (SYNTHROID) 88 MCG tablet Take 1 tablet (88 mcg total) by mouth before breakfast. 30 tablet 11    lisinopril 10 MG tablet lisinopril 10 mg tablet      losartan (COZAAR) 50 MG tablet Take 50 mg by mouth.      lovastatin (MEVACOR) 20 MG tablet Take 20 mg by mouth every evening.      metoprolol succinate (TOPROL-XL) 50 MG 24 hr tablet metoprolol succinate ER 50 mg tablet,extended release 24 hr   Take 1 tablet every day by oral route in the morning for 30 days.      ondansetron (ZOFRAN) 4 MG tablet Take 8 mg by mouth 2 (two) times daily.      ranolazine (RANEXA) 500 MG Tb12 Take 1,000 mg by mouth 2 (two) times daily.      rivaroxaban (XARELTO) 2.5 mg Tab Take 1 tablet (2.5 mg total) by mouth 2 (two) times daily with  "meals. 60 tablet 3       OBJECTIVE    VITAL SIGNS: 24 HR MIN & MAX LAST    Temp  Min: 98 °F (36.7 °C)  Max: 98 °F (36.7 °C)  98 °F (36.7 °C)        BP  Min: 159/81  Max: 159/81  (!) 159/81     Pulse  Min: 63  Max: 63  63     Resp  Min: 16  Max: 16  16    SpO2  Min: 99 %  Max: 99 %  99 %      HT: 4' 10" (147.3 cm)  WT: 69.9 kg (154 lb 1.6 oz)  BMI: 32.2       Physical Exam:  General: NAD  HEENT: Anicteric sclera  Resp: Unlabored respirations  Cardiac: RRR  Abdomen: Soft, NT, ND  Extremities: BLE WWP, no pallor, discoloration, skin breakdown/ulcers. No venous stasis.  LLE: Palpable DP and PT  RLE: Non-palpable DP and PT. PT Doppler signal present, DP signal absent.    Results  Recent Labs   Lab 08/13/22  0706 08/14/22  0443   WBC 13.0*  --    HGB 11.1*  --    HCT 35.4*  --      --     138   CHLORIDE 112* 111*   CO2 18* 18*   BUN 14.3 17.6   CREATININE 0.76 1.07*   GLUCOSE 113 90   CALCIUM 9.2 9.0   MG 1.50*  --    ALKPHOS 93  --        Imaging:  None    A/P:   62 y.o. female transferred from [facility] with extensive cardiac Hx including PVD, CKD-II, CHF with EF of 20%, CAD s/p STEMI on 7/2/22 c/b cardiogenic shock and NSTEMI on 7/11/22 c/b borderline broad complex tachycardia presenting with chronic R lower leg ischemia. Low suspicion for acute critical limb ischemia.    - No indications for surgical intervention at this time. Please call GS with questions  - f/u at [facility] vascular clinic after she follows-up with her cardiologist. Encouraged to call sooner to make a f/u appointment if her symptoms don't improve    Bird Gillette MD  [employer] General Surgery, PGY-1    I have reviewed the notes, assessments, and/or procedures performed by Dr Gillette, I concur with the documentation of Tia Rivers and Bird Gillette.  The patient does not exhibit critical limb ischemia, motor/sensation is intact.  The patient does describe claudication symptoms.  Because the limb is not acutely threatened, " optimization from a cardiac standpoint is required before vascular intervention can be attempted.  In addition, optimization of cardiac status may adequately resolve patient symptoms.  We will see her as an outpatient once optimized by the cardiology service.    Wes Valle MD  Vascular Surgery

## 2022-08-26 NOTE — PHYSICIAN QUERY
PT Name: Tia Rivers  MR #: [id number]     DOCUMENTATION CLARIFICATION      CDS/: Manuela Newell RN              Contact information: [email or web address]  This form is a permanent document in the medical record.    Query Date: August 26, 2022    By submitting this query, we are merely seeking further clarification of documentation to reflect the severity of illness of your patient. Please utilize your independent clinical judgment when addressing the question(s) below.     The Medical Record contains the following:   Indicators   Supporting Clinical Findings Location in Medical Record   x Chest Pain, Angina Chest Pain  The current episode started just prior to arrival. The chest pain is improving. At its most intense, the chest pain is at 10/10. The  chest pain is currently at 0/10. The quality of the pain is described as pressure-like.   ED Provider Note 8/13   x Coronary Artery Disease  extensive cardiac history which includes a h/o CAD/MI/CABG/PCI H&P 8/13       Cardiology     x EKG Sinus bradycardia with 1st degree A-V block  ST and T wave abnormality, consider inferior ischemia  Abnormal ECG   EKG 8/13   x Troponin Troponin I 0.195 (H) 1.194 (H) 1.131 (H)      Lab 8/13, 8/13, 8/14    Echo Results      Angiography     x Documentation of acute cardiac condition At this point I have appears that she has suffered from a type 2 non ST segment elevation myocardial infarction or demand ischemia.  There is no need to go to the cath lab at this point.    Problem List Items Addressed This Visit  NSTEMI (non-ST elevated myocardial infarction)     Progress Note 8/14       Cardiology      Discharge Summary 8/15       Cardiology   x Medication/Treatment  She presented there reported  chest pain, shortness of breath and palpitations overnight.  Found to be in a borderline wide complex tachyarrhythmia there.  BNP markedly elevated, troponin slightly elevated, mildly hypokalemic.  She was given Lopressor IV x3, 500  mcg digoxin, amiodarone  bolus and currently on drip.  Reportedly his baseline EF of 20%.  She also received aspirin 325 mg prior to transfer.  She reports no chest pain at this time.   ED Provider Note 8/13   x Other Active Hospital Problems :   (Principal) PSVT (paroxysmal supraventricular tachycardia) (Chronic)  Benign essential hypertension  Atypical angina  NSTEMI  Chronic systolic congestive heart failure  Cardiomyopathy  Arteriosclerosis of coronary artery      Discharge Summary 8/15       Cardiology      After study, please clarify the known or suspected Cardiac Condition related to the above documentation:    [   ] NSTEMI     [   ] NSTEMI/Myocardial Infarction Type 2 due to PSVT     [  X ] NSTEMI/Myocardial Infarction Type 2 due to (please specify): __PCI________     [   ] Demand Ischemia     [   ] Other Cardiac Diagnosis (please specify): _______________     [   ] Clinically Undetermined         Please document in your progress notes daily for the duration of treatment until resolved, and include in your discharge summary.  Form No. [id number]

## 2022-08-26 NOTE — PHYSICIAN QUERY
PT Name: Tia Rivers  MR #: [id number]     DOCUMENTATION CLARIFICATION     CDS/: Manuela Newell RN              Contact information: [email or web address]  This form is a permanent document in the medical record.     Query Date: August 26, 2022    By submitting this query, we are merely seeking further clarification of documentation.  Please utilize your independent clinical judgment when addressing the question(s) below.    The Medical Record contains the following   Indicators Supporting Clinical Findings Location in Medical Record   x Heart Failure documented Chronic systolic congestive heart failure Progress Note 8/14      Cardiology     x BNP BNP 2,450 Progress Note 8/14      Cardiology   x EF/Echo Reportedly his baseline EF of 20%. ED Provider Note 8/13    Radiology findings     x Subjective/Objective Respiratory Conditions She presented there reported chest pain, shortness of breath and palpitations overnight - she adds that the HR gets up to the 140's and is then assoc with chest pain/SOB and nausea and weakness.   H&P 8/13       Cardiology   x Recent/Current MI Transfer from [facility] for NSTEMI and tachycardia on amio gtt. ED Provider Note 8/13    Heart Transplant, LVAD No JVD present. ED Provider Note 8/13    Edema, JVD      Ascites     x Diuretics/Meds Furosemide 40mg IV    Furosemide 40mg oral 2 times daily  MAR 8/13  MAR 8/13- 8/15    Other Treatment      Other       Heart failure is a clinical diagnosis which includes symptomatic fluid retention, elevated intracardiac pressures, and/or the inability of the heart to deliver adequate blood flow.    Heart Failure with reduced Ejection Fraction (HFrEF) or Systolic Heart Failure (loses ability to contract normally, EF is <40%)    Heart Failure with preserved Ejection Fraction (HFpEF) or Diastolic Heart Failure (stiff ventricles, does not relax properly, EF is >50%)     Heart Failure with Combined Systolic and Diastolic Failure (stiff  ventricles, does not relax properly and EF is <50%)    Mid-range or mildly reduced ejection fraction (HFmrEF) is classified as systolic heart failure.  Congestive heart failure with a recovered EF is classified as Diastolic Heart Failure.  Common clues to acute exacerbation:  Rapidly progressive symptoms (w/in 2 weeks of presentation), using IV diuretics, using supplemental O2, pulmonary edema on Xray, new or worsening pleural effusion, +JVD or other signs of volume overload, MI w/in 4 weeks, and/or BNP >500  The clinical guidelines noted are only system guidelines, and do not replace the providers clinical judgment.    Provider, please clarify the acuity of  the Systolic Heart Failure diagnosis associated with the above clinical findings.    [   ]  Acute on Chronic Systolic Heart Failure (HFrEF or HFmrEF) - worsening of CHF signs/symptoms in preexisting CHF     [  X ]  Chronic Systolic Heart Failure (HFrEF or HFmrEF) - preexisting and stable     [   ]  Other (please specify): ___________________________________     [   ]  Clinically Undetermined         Please document in your progress notes daily for the duration of treatment until resolved and include in your discharge summary.    References:  American Heart Association editorial staff. (2017, May). Ejection Fraction Heart Failure Measurement. American Heart Association. https://www.heart.org/en/health-topics/heart-failure/diagnosing-heart-failure/ejection-fraction-heart-failure-measurement#:~:text=Ejection%20fraction%20(EF)%20is%20a,pushed%20out%20with%20each%20heartbeat  KIM Aguero (2020, December 15). Heart failure with preserved ejection fraction: Clinical manifestations and diagnosis. [employer]. https://www.[employer]contents/heart-failure-with-preserved-ejection-fraction-clinical-manifestations-and-diagnosis.  ICD-10-CM/PCS Coding Clinic Third Quarter ICD-10, Effective with discharges: September 8, 2020 [employer] [facility] [employer] § Heart failure  with mid-range or mildly reduced ejection fraction (2020).  ICD-10-CM/PCS Coding Clinic Third Quarter ICD-10, Effective with discharges: September 8, 2020 [facility] § Heart failure with recovered ejection fraction (2020).  Form No. [id number]

## 2022-11-14 PROBLEM — I21.4 NSTEMI (NON-ST ELEVATED MYOCARDIAL INFARCTION): Status: RESOLVED | Noted: 2022-08-13 | Resolved: 2022-11-14

## 2023-08-16 LAB
INR PPP: 1.2
PROTHROMBIN TIME: 15.2 SECONDS (ref 11.4–14)